# Patient Record
Sex: FEMALE | Race: WHITE | Employment: UNEMPLOYED | URBAN - METROPOLITAN AREA
[De-identification: names, ages, dates, MRNs, and addresses within clinical notes are randomized per-mention and may not be internally consistent; named-entity substitution may affect disease eponyms.]

---

## 2018-01-19 ENCOUNTER — HOSPITAL ENCOUNTER (OUTPATIENT)
Dept: PREADMISSION TESTING | Age: 33
Discharge: HOME | End: 2018-01-19

## 2018-01-19 VITALS
RESPIRATION RATE: 19 BRPM | WEIGHT: 138.67 LBS | OXYGEN SATURATION: 97 % | SYSTOLIC BLOOD PRESSURE: 104 MMHG | DIASTOLIC BLOOD PRESSURE: 59 MMHG | TEMPERATURE: 97.1 F | HEIGHT: 62 IN | BODY MASS INDEX: 25.52 KG/M2 | HEART RATE: 73 BPM

## 2018-01-19 NOTE — PERIOP NOTES
[facility]  PREOPERATIVE INSTRUCTIONS    Surgery Date: Friday, 1/26/18. Surgery arrival time given by surgeon: NO  (If [facility] staff will call you between 3pm - 7pm the day before surgery with your arrival time. If your surgery is on a Monday, we will call you the preceding Friday. Please call [phone] after 7pm if you did not receive your arrival time.)  Verification phone call on Thursday, 1/25/18. 1. Report  to the [address] [facility] on the day of your surgery. Bring your insurance card, photo identification, and any copayment (if applicable). 2. You must have a responsible adult to drive you home and stay with you the first 24 hours after surgery if you are going home the same day of your surgery. 3. Nothing to eat or drink after midnight the night before surgery. This means NO water, gum, mints, coffee, juice, etc.    4. MEDICATIONS TO TAKE THE MORNING OF SURGERY WITH A SIP OF WATER: None. 5. No alcoholic beverages 24 hours before and after your surgery. 6. If you are being admitted to the hospital,please leave personal belongings/luggage in your car until you have an assigned hospital room number. ( The hospital discharge time is 12 PM NOON. Your adult  should be at the hospital prior to the noon discharge time unless otherwise instructed.)   7. STOP Aspirin and/or any non-steroidal anti-inflammatory drugs (i.e. Ibuprofen, Naproxen, Advil, Aleve) as directed by your surgeon. You may take Tylenol. Stop herbal supplements 1 week prior to  surgery. 8. If you are currently taking Plavix, Coumadin,or any other blood-thinning/ anticoagulant medication contact your surgeon for instructions. 9. Wear comfortable clothes. Wear your glasses instead of contacts. Please leave all money, jewelry and valuables at home. No make up, particularly mascara, the day of surgery. 10.  REMOVE ALL body piercings, rings,and jewelry and leave at home.   Wear your hair loose or down, no pony-tails, buns, or any metal hair clips. 11. If you shower the morning of surgery, please do not apply any lotions, powders, or deodorants afterwards. Do not shave any body area within 24 hours of your surgery. 12. Please follow all instructions to avoid any potential surgical cancellation. 13. Should your physical condition change, (i.e. fever, cold, flu, etc.) please notify your surgeon as soon as possible. 14. It is important to be on time. If a situation occurs where you may be delayed, please call:  [phone] / [phone] on the day of surgery. 15. The [employer] staff can be reached at [phone]. 16. Special instructions: Free  parking. 17. The patient was contacted  in person. She  verbalize  understanding of all instructions does not  need reinforcement.

## 2018-01-25 ENCOUNTER — ANESTHESIA EVENT (OUTPATIENT)
Dept: SURGERY | Age: 33
End: 2018-01-25
Payer: SELF-PAY

## 2018-01-25 PROCEDURE — 76010010942 HC COSMETIC OR TIME CHARGE: Performed by: ANESTHESIOLOGY

## 2018-01-25 PROCEDURE — 76060000108 HC COSMETIC ANESTHESIA TIME CHARGE: Performed by: ANESTHESIOLOGY

## 2018-01-26 ENCOUNTER — HOSPITAL ENCOUNTER (OUTPATIENT)
Age: 33
Setting detail: OBSERVATION
Discharge: HOME | End: 2018-01-27
Attending: PLASTIC SURGERY | Admitting: PLASTIC SURGERY
Payer: SELF-PAY

## 2018-01-26 ENCOUNTER — ANESTHESIA (OUTPATIENT)
Dept: SURGERY | Age: 33
End: 2018-01-26
Payer: SELF-PAY

## 2018-01-26 DIAGNOSIS — K43.9 VENTRAL HERNIA WITHOUT OBSTRUCTION OR GANGRENE: Primary | ICD-10-CM

## 2018-01-26 PROCEDURE — 77030013358: Performed by: PLASTIC SURGERY

## 2018-01-26 PROCEDURE — 77030011266 HC ELECTRD BLD INSL COVD -A: Performed by: PLASTIC SURGERY

## 2018-01-26 PROCEDURE — 77030029372 HC ADH SKN CLSR PRINEO J&J -C

## 2018-01-26 PROCEDURE — 76060000033 HC ANESTHESIA 1 TO 1.5 HR: Performed by: PLASTIC SURGERY

## 2018-01-26 PROCEDURE — 77030020126 HC NDL ELECTRD MICROFN MEGA -B: Performed by: PLASTIC SURGERY

## 2018-01-26 PROCEDURE — 77030034850: Performed by: PLASTIC SURGERY

## 2018-01-26 PROCEDURE — 77030018719 HC DRSG PTCH ANTIMIC J&J -A: Performed by: PLASTIC SURGERY

## 2018-01-26 PROCEDURE — 77030002916 HC SUT ETHLN J&J -A: Performed by: PLASTIC SURGERY

## 2018-01-26 PROCEDURE — 74011250636 HC RX REV CODE- 250/636: Performed by: PLASTIC SURGERY

## 2018-01-26 PROCEDURE — 77030013567 HC DRN WND RESERV BARD -A: Performed by: PLASTIC SURGERY

## 2018-01-26 PROCEDURE — 76210000006 HC OR PH I REC 0.5 TO 1 HR: Performed by: PLASTIC SURGERY

## 2018-01-26 PROCEDURE — 77030033138 HC SUT PGA STRATFX J&J -B: Performed by: PLASTIC SURGERY

## 2018-01-26 PROCEDURE — 77030011267 HC ELECTRD BLD COVD -A: Performed by: PLASTIC SURGERY

## 2018-01-26 PROCEDURE — 74011250636 HC RX REV CODE- 250/636: Performed by: ANESTHESIOLOGY

## 2018-01-26 PROCEDURE — 77030008463 HC STPLR SKN PROX J&J -B: Performed by: PLASTIC SURGERY

## 2018-01-26 PROCEDURE — 77030010507 HC ADH SKN DERMBND J&J -B

## 2018-01-26 PROCEDURE — 77030020782 HC GWN BAIR PAWS FLX 3M -B

## 2018-01-26 PROCEDURE — 77030013629 HC ELECTRD NDL STRY -B: Performed by: PLASTIC SURGERY

## 2018-01-26 PROCEDURE — 99218 HC RM OBSERVATION: CPT

## 2018-01-26 PROCEDURE — 77030018836 HC SOL IRR NACL ICUM -A: Performed by: PLASTIC SURGERY

## 2018-01-26 PROCEDURE — 77030008534 HC TBNG LIPOSUC BYRO -B: Performed by: PLASTIC SURGERY

## 2018-01-26 PROCEDURE — 77030035236 HC SUT PDS STRATFX BARB J&J -B: Performed by: PLASTIC SURGERY

## 2018-01-26 PROCEDURE — 77030010512 HC APPL CLP LIG J&J -C: Performed by: PLASTIC SURGERY

## 2018-01-26 PROCEDURE — 77030036554: Performed by: PLASTIC SURGERY

## 2018-01-26 PROCEDURE — 76010000149 HC OR TIME 1 TO 1.5 HR: Performed by: PLASTIC SURGERY

## 2018-01-26 PROCEDURE — 77030011244 HC DRN WND HUBLS J&J -B: Performed by: PLASTIC SURGERY

## 2018-01-26 PROCEDURE — 76060000053 HC ANESTHESIA 11 TO 11.5 HR: Performed by: PLASTIC SURGERY

## 2018-01-26 PROCEDURE — 77030013079 HC BLNKT BAIR HGGR 3M -A: Performed by: ANESTHESIOLOGY

## 2018-01-26 PROCEDURE — 77030003666 HC NDL SPINAL BD -A: Performed by: PLASTIC SURGERY

## 2018-01-26 PROCEDURE — 77030020061 HC IV BLD WRMR ADMIN SET 3M -B: Performed by: ANESTHESIOLOGY

## 2018-01-26 PROCEDURE — 77030022517 HC TRNSFR ST F/ IMPL MENT -B: Performed by: PLASTIC SURGERY

## 2018-01-26 PROCEDURE — 77030002933 HC SUT MCRYL J&J -A: Performed by: PLASTIC SURGERY

## 2018-01-26 PROCEDURE — 77030010507 HC ADH SKN DERMBND J&J -B: Performed by: PLASTIC SURGERY

## 2018-01-26 PROCEDURE — 74011250636 HC RX REV CODE- 250/636

## 2018-01-26 PROCEDURE — 77030012412 HC DRN WND CARD -B: Performed by: PLASTIC SURGERY

## 2018-01-26 PROCEDURE — 77030002966 HC SUT PDS J&J -A: Performed by: PLASTIC SURGERY

## 2018-01-26 PROCEDURE — 77030026438 HC STYL ET INTUB CARD -A: Performed by: ANESTHESIOLOGY

## 2018-01-26 PROCEDURE — 77010033678 HC OXYGEN DAILY

## 2018-01-26 PROCEDURE — 77030008459 HC STPLR SKN COOP -B: Performed by: PLASTIC SURGERY

## 2018-01-26 PROCEDURE — 74011250637 HC RX REV CODE- 250/637: Performed by: PLASTIC SURGERY

## 2018-01-26 PROCEDURE — 77030019908 HC STETH ESOPH SIMS -A: Performed by: ANESTHESIOLOGY

## 2018-01-26 PROCEDURE — 74011000272 HC RX REV CODE- 272: Performed by: PLASTIC SURGERY

## 2018-01-26 PROCEDURE — 77030008684 HC TU ET CUF COVD -B: Performed by: ANESTHESIOLOGY

## 2018-01-26 PROCEDURE — 77030016570 HC BLNKT BAIR HGGR 3M -B: Performed by: PLASTIC SURGERY

## 2018-01-26 PROCEDURE — 77030029372 HC ADH SKN CLSR PRINEO J&J -C: Performed by: PLASTIC SURGERY

## 2018-01-26 PROCEDURE — 74011000250 HC RX REV CODE- 250

## 2018-01-26 PROCEDURE — 77030002986 HC SUT PROL J&J -A: Performed by: PLASTIC SURGERY

## 2018-01-26 PROCEDURE — 77030012406 HC DRN WND PENRS BARD -A: Performed by: PLASTIC SURGERY

## 2018-01-26 PROCEDURE — 77030002925 HC SUT GORTX WLGO -C: Performed by: PLASTIC SURGERY

## 2018-01-26 PROCEDURE — 76010000151: Performed by: PLASTIC SURGERY

## 2018-01-26 PROCEDURE — 77030008467 HC STPLR SKN COVD -B: Performed by: PLASTIC SURGERY

## 2018-01-26 PROCEDURE — 77030008526 HC TBNG ASPIR DISP MCRA -B: Performed by: PLASTIC SURGERY

## 2018-01-26 PROCEDURE — 77030010514 HC APPL CLP LIG COVD -B: Performed by: PLASTIC SURGERY

## 2018-01-26 PROCEDURE — 74011000250 HC RX REV CODE- 250: Performed by: PLASTIC SURGERY

## 2018-01-26 PROCEDURE — 77030032490 HC SLV COMPR SCD KNE COVD -B: Performed by: PLASTIC SURGERY

## 2018-01-26 PROCEDURE — 77030002986 HC SUT PROL J&J -A

## 2018-01-26 PROCEDURE — 77030031139 HC SUT VCRL2 J&J -A: Performed by: PLASTIC SURGERY

## 2018-01-26 PROCEDURE — 77030011640 HC PAD GRND REM COVD -A: Performed by: PLASTIC SURGERY

## 2018-01-26 PROCEDURE — 77030002996 HC SUT SLK J&J -A: Performed by: PLASTIC SURGERY

## 2018-01-26 PROCEDURE — 77030002991 HC SUT QUILL SSPC -B: Performed by: PLASTIC SURGERY

## 2018-01-26 PROCEDURE — 77030026227 HC FUNL KELLR 2 PCH ALGN -C: Performed by: PLASTIC SURGERY

## 2018-01-26 DEVICE — SMOOTH ROUND MODERATE PLUS PROFILE GEL-FILLED BREAST IMPLANT, 250CC  SMOOTH ROUND SILICONE
Type: IMPLANTABLE DEVICE | Site: BREAST | Status: FUNCTIONAL
Brand: MENTOR MEMORYGEL BREAST IMPLANT

## 2018-01-26 RX ORDER — DEXTROSE, SODIUM CHLORIDE, AND POTASSIUM CHLORIDE 5; .45; .15 G/100ML; G/100ML; G/100ML
125 INJECTION INTRAVENOUS CONTINUOUS
Status: DISCONTINUED | OUTPATIENT
Start: 2018-01-26 | End: 2018-01-27 | Stop reason: HOSPADM

## 2018-01-26 RX ORDER — EPHEDRINE SULFATE 50 MG/ML
INJECTION, SOLUTION INTRAVENOUS AS NEEDED
Status: DISCONTINUED | OUTPATIENT
Start: 2018-01-26 | End: 2018-01-26 | Stop reason: HOSPADM

## 2018-01-26 RX ORDER — MIDAZOLAM HYDROCHLORIDE 1 MG/ML
INJECTION, SOLUTION INTRAMUSCULAR; INTRAVENOUS AS NEEDED
Status: DISCONTINUED | OUTPATIENT
Start: 2018-01-26 | End: 2018-01-26 | Stop reason: HOSPADM

## 2018-01-26 RX ORDER — NALOXONE HYDROCHLORIDE 0.4 MG/ML
0.2 INJECTION, SOLUTION INTRAMUSCULAR; INTRAVENOUS; SUBCUTANEOUS
Status: DISCONTINUED | OUTPATIENT
Start: 2018-01-26 | End: 2018-01-26 | Stop reason: HOSPADM

## 2018-01-26 RX ORDER — MIDAZOLAM HYDROCHLORIDE 1 MG/ML
2 INJECTION, SOLUTION INTRAMUSCULAR; INTRAVENOUS
Status: DISCONTINUED | OUTPATIENT
Start: 2018-01-26 | End: 2018-01-26 | Stop reason: HOSPADM

## 2018-01-26 RX ORDER — SODIUM CHLORIDE 0.9 % (FLUSH) 0.9 %
5-10 SYRINGE (ML) INJECTION AS NEEDED
Status: DISCONTINUED | OUTPATIENT
Start: 2018-01-26 | End: 2018-01-27 | Stop reason: HOSPADM

## 2018-01-26 RX ORDER — KETAMINE HYDROCHLORIDE 10 MG/ML
INJECTION, SOLUTION INTRAMUSCULAR; INTRAVENOUS AS NEEDED
Status: DISCONTINUED | OUTPATIENT
Start: 2018-01-26 | End: 2018-01-26 | Stop reason: HOSPADM

## 2018-01-26 RX ORDER — PROPOFOL 10 MG/ML
INJECTION, EMULSION INTRAVENOUS AS NEEDED
Status: DISCONTINUED | OUTPATIENT
Start: 2018-01-26 | End: 2018-01-26 | Stop reason: HOSPADM

## 2018-01-26 RX ORDER — ONDANSETRON 2 MG/ML
4 INJECTION INTRAMUSCULAR; INTRAVENOUS
Status: DISCONTINUED | OUTPATIENT
Start: 2018-01-26 | End: 2018-01-27 | Stop reason: HOSPADM

## 2018-01-26 RX ORDER — OXYCODONE HYDROCHLORIDE 5 MG/1
10 TABLET ORAL
Status: DISCONTINUED | OUTPATIENT
Start: 2018-01-26 | End: 2018-01-27

## 2018-01-26 RX ORDER — LIDOCAINE HYDROCHLORIDE 20 MG/ML
INJECTION, SOLUTION EPIDURAL; INFILTRATION; INTRACAUDAL; PERINEURAL AS NEEDED
Status: DISCONTINUED | OUTPATIENT
Start: 2018-01-26 | End: 2018-01-26 | Stop reason: HOSPADM

## 2018-01-26 RX ORDER — SODIUM CHLORIDE, SODIUM LACTATE, POTASSIUM CHLORIDE, CALCIUM CHLORIDE 600; 310; 30; 20 MG/100ML; MG/100ML; MG/100ML; MG/100ML
125 INJECTION, SOLUTION INTRAVENOUS CONTINUOUS
Status: DISCONTINUED | OUTPATIENT
Start: 2018-01-26 | End: 2018-01-26 | Stop reason: HOSPADM

## 2018-01-26 RX ORDER — HYDROMORPHONE HYDROCHLORIDE 2 MG/ML
.25-1 INJECTION, SOLUTION INTRAMUSCULAR; INTRAVENOUS; SUBCUTANEOUS
Status: DISCONTINUED | OUTPATIENT
Start: 2018-01-26 | End: 2018-01-26 | Stop reason: HOSPADM

## 2018-01-26 RX ORDER — FENTANYL CITRATE 50 UG/ML
INJECTION, SOLUTION INTRAMUSCULAR; INTRAVENOUS AS NEEDED
Status: DISCONTINUED | OUTPATIENT
Start: 2018-01-26 | End: 2018-01-26 | Stop reason: HOSPADM

## 2018-01-26 RX ORDER — SODIUM CHLORIDE 0.9 % (FLUSH) 0.9 %
5-10 SYRINGE (ML) INJECTION EVERY 8 HOURS
Status: DISCONTINUED | OUTPATIENT
Start: 2018-01-26 | End: 2018-01-27 | Stop reason: HOSPADM

## 2018-01-26 RX ORDER — ROCURONIUM BROMIDE 10 MG/ML
INJECTION, SOLUTION INTRAVENOUS AS NEEDED
Status: DISCONTINUED | OUTPATIENT
Start: 2018-01-26 | End: 2018-01-26 | Stop reason: HOSPADM

## 2018-01-26 RX ORDER — LEVOFLOXACIN 5 MG/ML
500 INJECTION, SOLUTION INTRAVENOUS EVERY 24 HOURS
Status: COMPLETED | OUTPATIENT
Start: 2018-01-27 | End: 2018-01-27

## 2018-01-26 RX ORDER — ONDANSETRON 2 MG/ML
INJECTION INTRAMUSCULAR; INTRAVENOUS AS NEEDED
Status: DISCONTINUED | OUTPATIENT
Start: 2018-01-26 | End: 2018-01-26 | Stop reason: HOSPADM

## 2018-01-26 RX ORDER — LEVOFLOXACIN 5 MG/ML
500 INJECTION, SOLUTION INTRAVENOUS ONCE
Status: COMPLETED | OUTPATIENT
Start: 2018-01-26 | End: 2018-01-26

## 2018-01-26 RX ORDER — LIDOCAINE HYDROCHLORIDE 10 MG/ML
0.1 INJECTION, SOLUTION EPIDURAL; INFILTRATION; INTRACAUDAL; PERINEURAL AS NEEDED
Status: DISCONTINUED | OUTPATIENT
Start: 2018-01-26 | End: 2018-01-26 | Stop reason: HOSPADM

## 2018-01-26 RX ORDER — OXYCODONE HYDROCHLORIDE 5 MG/1
5 TABLET ORAL
Status: DISCONTINUED | OUTPATIENT
Start: 2018-01-26 | End: 2018-01-27 | Stop reason: HOSPADM

## 2018-01-26 RX ORDER — DIPHENHYDRAMINE HYDROCHLORIDE 50 MG/ML
12.5 INJECTION, SOLUTION INTRAMUSCULAR; INTRAVENOUS AS NEEDED
Status: DISCONTINUED | OUTPATIENT
Start: 2018-01-26 | End: 2018-01-26 | Stop reason: HOSPADM

## 2018-01-26 RX ORDER — HYDROMORPHONE HYDROCHLORIDE 2 MG/ML
INJECTION, SOLUTION INTRAMUSCULAR; INTRAVENOUS; SUBCUTANEOUS AS NEEDED
Status: DISCONTINUED | OUTPATIENT
Start: 2018-01-26 | End: 2018-01-26 | Stop reason: HOSPADM

## 2018-01-26 RX ORDER — FLUMAZENIL 0.1 MG/ML
0.2 INJECTION INTRAVENOUS
Status: DISCONTINUED | OUTPATIENT
Start: 2018-01-26 | End: 2018-01-26 | Stop reason: HOSPADM

## 2018-01-26 RX ORDER — DIPHENHYDRAMINE HCL 25 MG
25 CAPSULE ORAL
Status: DISPENSED | OUTPATIENT
Start: 2018-01-26 | End: 2018-01-27

## 2018-01-26 RX ORDER — DEXAMETHASONE SODIUM PHOSPHATE 4 MG/ML
INJECTION, SOLUTION INTRA-ARTICULAR; INTRALESIONAL; INTRAMUSCULAR; INTRAVENOUS; SOFT TISSUE AS NEEDED
Status: DISCONTINUED | OUTPATIENT
Start: 2018-01-26 | End: 2018-01-26 | Stop reason: HOSPADM

## 2018-01-26 RX ORDER — ENOXAPARIN SODIUM 100 MG/ML
40 INJECTION SUBCUTANEOUS EVERY 24 HOURS
Status: DISCONTINUED | OUTPATIENT
Start: 2018-01-27 | End: 2018-01-27 | Stop reason: HOSPADM

## 2018-01-26 RX ORDER — HYDROMORPHONE HYDROCHLORIDE 2 MG/ML
2 INJECTION, SOLUTION INTRAMUSCULAR; INTRAVENOUS; SUBCUTANEOUS
Status: DISCONTINUED | OUTPATIENT
Start: 2018-01-26 | End: 2018-01-27 | Stop reason: HOSPADM

## 2018-01-26 RX ADMIN — ROCURONIUM BROMIDE 20 MG: 10 INJECTION, SOLUTION INTRAVENOUS at 15:03

## 2018-01-26 RX ADMIN — FENTANYL CITRATE 100 MCG: 50 INJECTION, SOLUTION INTRAMUSCULAR; INTRAVENOUS at 12:09

## 2018-01-26 RX ADMIN — FENTANYL CITRATE 100 MCG: 50 INJECTION, SOLUTION INTRAMUSCULAR; INTRAVENOUS at 15:06

## 2018-01-26 RX ADMIN — FENTANYL CITRATE 50 MCG: 50 INJECTION, SOLUTION INTRAMUSCULAR; INTRAVENOUS at 11:30

## 2018-01-26 RX ADMIN — ROCURONIUM BROMIDE 20 MG: 10 INJECTION, SOLUTION INTRAVENOUS at 14:05

## 2018-01-26 RX ADMIN — ROCURONIUM BROMIDE 20 MG: 10 INJECTION, SOLUTION INTRAVENOUS at 16:41

## 2018-01-26 RX ADMIN — EPHEDRINE SULFATE 10 MG: 50 INJECTION, SOLUTION INTRAVENOUS at 11:07

## 2018-01-26 RX ADMIN — KETAMINE HYDROCHLORIDE 50 MG: 10 INJECTION, SOLUTION INTRAMUSCULAR; INTRAVENOUS at 10:00

## 2018-01-26 RX ADMIN — EPHEDRINE SULFATE 10 MG: 50 INJECTION, SOLUTION INTRAVENOUS at 10:53

## 2018-01-26 RX ADMIN — FENTANYL CITRATE 100 MCG: 50 INJECTION, SOLUTION INTRAMUSCULAR; INTRAVENOUS at 18:29

## 2018-01-26 RX ADMIN — ROCURONIUM BROMIDE 10 MG: 10 INJECTION, SOLUTION INTRAVENOUS at 11:09

## 2018-01-26 RX ADMIN — ROCURONIUM BROMIDE 20 MG: 10 INJECTION, SOLUTION INTRAVENOUS at 18:26

## 2018-01-26 RX ADMIN — ONDANSETRON 4 MG: 2 INJECTION INTRAMUSCULAR; INTRAVENOUS at 23:04

## 2018-01-26 RX ADMIN — FENTANYL CITRATE 100 MCG: 50 INJECTION, SOLUTION INTRAMUSCULAR; INTRAVENOUS at 13:13

## 2018-01-26 RX ADMIN — ROCURONIUM BROMIDE 10 MG: 10 INJECTION, SOLUTION INTRAVENOUS at 11:31

## 2018-01-26 RX ADMIN — HYDROMORPHONE HYDROCHLORIDE 0.5 MG: 2 INJECTION, SOLUTION INTRAMUSCULAR; INTRAVENOUS; SUBCUTANEOUS at 20:25

## 2018-01-26 RX ADMIN — FENTANYL CITRATE 50 MCG: 50 INJECTION, SOLUTION INTRAMUSCULAR; INTRAVENOUS at 21:02

## 2018-01-26 RX ADMIN — ROCURONIUM BROMIDE 40 MG: 10 INJECTION, SOLUTION INTRAVENOUS at 10:00

## 2018-01-26 RX ADMIN — PROPOFOL 160 MG: 10 INJECTION, EMULSION INTRAVENOUS at 10:00

## 2018-01-26 RX ADMIN — LIDOCAINE HYDROCHLORIDE 50 MG: 20 INJECTION, SOLUTION EPIDURAL; INFILTRATION; INTRACAUDAL; PERINEURAL at 10:00

## 2018-01-26 RX ADMIN — SODIUM CHLORIDE, SODIUM LACTATE, POTASSIUM CHLORIDE, AND CALCIUM CHLORIDE: 600; 310; 30; 20 INJECTION, SOLUTION INTRAVENOUS at 15:04

## 2018-01-26 RX ADMIN — FENTANYL CITRATE 100 MCG: 50 INJECTION, SOLUTION INTRAMUSCULAR; INTRAVENOUS at 18:02

## 2018-01-26 RX ADMIN — SODIUM CHLORIDE, SODIUM LACTATE, POTASSIUM CHLORIDE, AND CALCIUM CHLORIDE: 600; 310; 30; 20 INJECTION, SOLUTION INTRAVENOUS at 18:04

## 2018-01-26 RX ADMIN — FENTANYL CITRATE 200 MCG: 50 INJECTION, SOLUTION INTRAMUSCULAR; INTRAVENOUS at 10:00

## 2018-01-26 RX ADMIN — HYDROMORPHONE HYDROCHLORIDE 1 MG: 2 INJECTION, SOLUTION INTRAMUSCULAR; INTRAVENOUS; SUBCUTANEOUS at 19:07

## 2018-01-26 RX ADMIN — DEXAMETHASONE SODIUM PHOSPHATE 8 MG: 4 INJECTION, SOLUTION INTRA-ARTICULAR; INTRALESIONAL; INTRAMUSCULAR; INTRAVENOUS; SOFT TISSUE at 10:05

## 2018-01-26 RX ADMIN — ONDANSETRON 4 MG: 2 INJECTION INTRAMUSCULAR; INTRAVENOUS at 19:57

## 2018-01-26 RX ADMIN — EPHEDRINE SULFATE 10 MG: 50 INJECTION, SOLUTION INTRAVENOUS at 12:42

## 2018-01-26 RX ADMIN — MIDAZOLAM HYDROCHLORIDE 3 MG: 1 INJECTION, SOLUTION INTRAMUSCULAR; INTRAVENOUS at 09:53

## 2018-01-26 RX ADMIN — SODIUM CHLORIDE, SODIUM LACTATE, POTASSIUM CHLORIDE, AND CALCIUM CHLORIDE 125 ML/HR: 600; 310; 30; 20 INJECTION, SOLUTION INTRAVENOUS at 08:41

## 2018-01-26 RX ADMIN — ROCURONIUM BROMIDE 20 MG: 10 INJECTION, SOLUTION INTRAVENOUS at 12:09

## 2018-01-26 RX ADMIN — FENTANYL CITRATE 50 MCG: 50 INJECTION, SOLUTION INTRAMUSCULAR; INTRAVENOUS at 21:01

## 2018-01-26 RX ADMIN — HYDROMORPHONE HYDROCHLORIDE 0.5 MG: 2 INJECTION INTRAMUSCULAR; INTRAVENOUS; SUBCUTANEOUS at 21:28

## 2018-01-26 RX ADMIN — LEVOFLOXACIN 500 MG: 5 INJECTION, SOLUTION INTRAVENOUS at 09:53

## 2018-01-26 RX ADMIN — EPHEDRINE SULFATE 10 MG: 50 INJECTION, SOLUTION INTRAVENOUS at 10:48

## 2018-01-26 RX ADMIN — FENTANYL CITRATE 100 MCG: 50 INJECTION, SOLUTION INTRAMUSCULAR; INTRAVENOUS at 16:16

## 2018-01-26 RX ADMIN — FENTANYL CITRATE 100 MCG: 50 INJECTION, SOLUTION INTRAMUSCULAR; INTRAVENOUS at 10:38

## 2018-01-26 RX ADMIN — HYDROMORPHONE HYDROCHLORIDE 0.5 MG: 2 INJECTION, SOLUTION INTRAMUSCULAR; INTRAVENOUS; SUBCUTANEOUS at 20:03

## 2018-01-26 RX ADMIN — SODIUM CHLORIDE, SODIUM LACTATE, POTASSIUM CHLORIDE, AND CALCIUM CHLORIDE: 600; 310; 30; 20 INJECTION, SOLUTION INTRAVENOUS at 12:11

## 2018-01-26 RX ADMIN — ROCURONIUM BROMIDE 20 MG: 10 INJECTION, SOLUTION INTRAVENOUS at 17:18

## 2018-01-26 RX ADMIN — SODIUM CHLORIDE, SODIUM LACTATE, POTASSIUM CHLORIDE, AND CALCIUM CHLORIDE: 600; 310; 30; 20 INJECTION, SOLUTION INTRAVENOUS at 20:33

## 2018-01-26 RX ADMIN — OXYCODONE HYDROCHLORIDE 10 MG: 5 TABLET ORAL at 23:02

## 2018-01-26 RX ADMIN — SODIUM CHLORIDE, SODIUM LACTATE, POTASSIUM CHLORIDE, AND CALCIUM CHLORIDE: 600; 310; 30; 20 INJECTION, SOLUTION INTRAVENOUS at 10:45

## 2018-01-26 RX ADMIN — FENTANYL CITRATE 100 MCG: 50 INJECTION, SOLUTION INTRAMUSCULAR; INTRAVENOUS at 14:05

## 2018-01-26 RX ADMIN — ROCURONIUM BROMIDE 20 MG: 10 INJECTION, SOLUTION INTRAVENOUS at 13:13

## 2018-01-26 RX ADMIN — FENTANYL CITRATE 50 MCG: 50 INJECTION, SOLUTION INTRAMUSCULAR; INTRAVENOUS at 17:17

## 2018-01-26 RX ADMIN — MIDAZOLAM HYDROCHLORIDE 2 MG: 1 INJECTION, SOLUTION INTRAMUSCULAR; INTRAVENOUS at 09:55

## 2018-01-26 RX ADMIN — POTASSIUM CHLORIDE, DEXTROSE MONOHYDRATE AND SODIUM CHLORIDE 125 ML/HR: 150; 5; 450 INJECTION, SOLUTION INTRAVENOUS at 21:23

## 2018-01-26 RX ADMIN — ROCURONIUM BROMIDE 20 MG: 10 INJECTION, SOLUTION INTRAVENOUS at 15:51

## 2018-01-26 NOTE — PERIOP NOTES
10:50 AM  Family called and informed of patient's progress. 12:22 PM  Family called and informed of patient's progress. 1:51 PM  Family called and informed of patient's progress. 3:27 PM  Family called and informed of patient's progress.

## 2018-01-26 NOTE — IP AVS SNAPSHOT
[address] [facility] [address] 
 
 
 [address] [facility] 
[phone] Patient: Celia Sarah MRN: [id number] :[birth year] About your hospitalization You were admitted on:  January 26, 2018 You last received care in the:  [facility] 4M POST SURG ORT 1 You were discharged on:  January 27, 2018 Why you were hospitalized Your primary diagnosis was:  Not on File Your diagnoses also included:  Ventral Hernia Follow-up Information Follow up With Details Comments Contact Info [facility] [address] 
[phone] Katheryn Gilman MD Schedule an appointment as soon as possible for a visit in 2 days  [address] [place] [address] 
[phone] Discharge Orders None A check reed indicates which time of day the medication should be taken. My Medications START taking these medications Instructions Each Dose to Equal  
 Morning Noon Evening Bedtime  
 diazePAM 5 mg tablet Commonly known as:  VALIUM Your last dose was:  Have not taken in the hospital 
  
Your next dose is:  Anytime as needed. Take 1 Tab by mouth every six (6) hours as needed for Anxiety. Max Daily Amount: 20 mg. Indications: Muscle Spasm 5 mg HYDROmorphone 2 mg tablet Commonly known as:  DILAUDID Your last dose was:  1/27/18 2:50 p.m. Your next dose is:  1/27/18 6:50 pm if needed Take 1 Tab by mouth every four (4) hours as needed for Pain (May take 2 tabs). Max Daily Amount: 12 mg.  
 2 mg  
    
   
   
   
  
 ondansetron 4 mg disintegrating tablet Commonly known as:  ZOFRAN ODT Your last dose was:  Have not taken in the hospital.   
Your next dose is:  Anytime as needed. Take 1 Tab by mouth every eight (8) hours as needed for Nausea. 4 mg Where to Get Your Medications Information on where to get these meds will be given to you by the nurse or doctor. ! Ask your nurse or doctor about these medications  
  diazePAM 5 mg tablet HYDROmorphone 2 mg tablet  
 ondansetron 4 mg disintegrating tablet Discharge Instructions Keep dressings dry and in place until follow up. Follow NINI drain care instructions and bring log to follow up appointment. Avoid strenuous activity, pushing, pulling, or heavy lifting. Sleep on back only with head elevated on 3-4 pillows. Introducing [facility] SERVICES! Sheron Sahni introduces [employer] patient portal. Now you can access parts of your medical record, email your doctor's office, and request medication refills online. 1. In your internet browser, go to [email or web address] 2. Click on the First Time User? Click Here link in the Sign In box. You will see the New Member Sign Up page. 3. Enter your [employer] Access Code exactly as it appears below. You will not need to use this code after youve completed the sign-up process. If you do not sign up before the expiration date, you must request a new code. · [employer] Access Code: [email or web address] Expires: 4/19/2018  1:58 PM 
 
4. Enter the last four digits of your Social Security Number (xxxx) and Date of Birth (mm/dd/yyyy) as indicated and click Submit. You will be taken to the next sign-up page. 5. Create a [employer] ID. This will be your [employer] login ID and cannot be changed, so think of one that is secure and easy to remember. 6. Create a [employer] password. You can change your password at any time. 7. Enter your Password Reset Question and Answer. This can be used at a later time if you forget your password. 8. Enter your e-mail address. You will receive e-mail notification when new information is available in [address] 9. Click Sign Up. You can now view and download portions of your medical record. 10. Click the Download Summary menu link to download a portable copy of your medical information. If you have questions, please visit the Frequently Asked Questions section of the [employer] website. Remember, [employer] is NOT to be used for urgent needs. For medical emergencies, dial 911. Now available from your iPhone and Android! Providers Seen During Your Hospitalization Provider Specialty Primary office phone Noelia Canavan, MD Plastic Surgery [phone] Your Primary Care Physician (PCP) Primary Care Physician Office Phone Office Fax Cheryle Valladares [phone] You are allergic to the following Allergen Reactions Pcn (Penicillins) Anaphylaxis Recent Documentation OB Status Smoking Status Hysterectomy Never Smoker Emergency Contacts Name Discharge Info Relation Home Work Mobile [facility] CAREGIVER [3] Spouse [3] [phone] Patient Belongings The following personal items are in your possession at time of discharge: 
  Dental Appliances: None  Visual Aid: None Discharge Instructions Attachments/References ABDOMINAL HERNIA REPAIR: POST-OP (ENGLISH) SURGICAL DRAIN CARE (ENGLISH) LIPOSUCTION: POST-OP (ENGLISH) Patient Handouts Abdominal Hernia Repair: What to Expect at Home Your Recovery After surgery to repair your hernia, you are likely to have pain for a few days. You may also feel like you have the flu, and you may have a low fever and feel tired and nauseated. This is common. You should feel better after a few days and will probably feel much better in 7 days. For several weeks you may feel twinges or pulling in the hernia repair when you move. You may have some bruising around the area of your hernia repair.  This is normal. 
This care sheet gives you a general idea about how long it will take for you to recover. But each person recovers at a different pace. Follow the steps below to get better as quickly as possible. How can you care for yourself at home? Activity ? · Rest when you feel tired. Getting enough sleep will help you recover. ? · Try to walk each day. Start by walking a little more than you did the day before. Bit by bit, increase the amount you walk. Walking boosts blood flow and helps prevent pneumonia and constipation. ? · If your doctor gives you an abdominal binder to wear, use it as directed. This is an elastic bandage that wraps around your belly and upper hips. It helps support your belly muscles after surgery. ? · Avoid strenuous activities, such as biking, jogging, weight lifting, or aerobic exercise, until your doctor says it is okay. ? · Avoid lifting anything that would make you strain. This may include heavy grocery bags and milk containers, a heavy briefcase or backpack, cat litter or dog food bags, a vacuum , or a child. ? · Ask your doctor when you can drive again. ? · Most people are able to return to work within 1 to 2 weeks after surgery. But if your job requires that you to do heavy lifting or strenuous activity, you may need to take 4 to 6 weeks off from work. ? · You may shower 24 to 48 hours after surgery, if your doctor okays it. Pat the cut (incision) dry. Do not take a bath for the first 2 weeks, or until your doctor tells you it is okay. ? · Ask your doctor when it is okay for you to have sex. Diet ? · You can eat your normal diet. If your stomach is upset, try bland, low-fat foods like plain rice, broiled chicken, toast, and yogurt. ? · Drink plenty of fluids (unless your doctor tells you not to). ? · You may notice that your bowel movements are not regular right after your surgery. This is common. Avoid constipation and straining with bowel movements. You may want to take a fiber supplement every day.  If you have not had a bowel movement after a couple of days, ask your doctor about taking a mild laxative. Medicines ? · Your doctor will tell you if and when you can restart your medicines. He or she will also give you instructions about taking any new medicines. ? · If you take blood thinners, such as warfarin (Coumadin), clopidogrel (Plavix), or aspirin, be sure to talk to your doctor. He or she will tell you if and when to start taking those medicines again. Make sure that you understand exactly what your doctor wants you to do. ? · Be safe with medicines. Take pain medicines exactly as directed. ¨ If the doctor gave you a prescription medicine for pain, take it as prescribed. ¨ If you are not taking a prescription pain medicine, ask your doctor if you can take an over-the-counter medicine. ? · If your doctor prescribed antibiotics, take them as directed. Do not stop taking them just because you feel better. You need to take the full course of antibiotics. ? · If you think your pain medicine is making you sick to your stomach: 
¨ Take your medicine after meals (unless your doctor has told you not to). ¨ Ask your doctor for a different pain medicine. Incision care ? · If you have strips of tape on the cut (incision) the doctor made, leave the tape on for a week or until it falls off. Or follow your doctor's instructions for removing the tape. ? · If you have staples closing the cut, you will need to visit your doctor in 1 to 2 weeks to have them removed. ? · Wash the area daily with warm, soapy water, and pat it dry. Don't use hydrogen peroxide or alcohol, which can slow healing. You may cover the area with a gauze bandage if it weeps or rubs against clothing. Change the bandage every day. Other instructions ? · Hold a pillow over your incision when you cough or take deep breaths. This will support your belly and decrease your pain. ? · Do breathing exercises at home as instructed by your doctor. This will help prevent pneumonia. ? · If you had laparoscopic surgery, you may also have pain in your left shoulder. The pain usually lasts about a day or two. Follow-up care is a key part of your treatment and safety. Be sure to make and go to all appointments, and call your doctor if you are having problems. It's also a good idea to know your test results and keep a list of the medicines you take. When should you call for help? Call 911 anytime you think you may need emergency care. For example, call if: 
? · You passed out (lost consciousness). ? · You are short of breath. ?Call your doctor now or seek immediate medical care if: 
? · You are sick to your stomach and cannot drink fluids. ? · You have signs of a blood clot in your leg (called a deep vein thrombosis), such as: 
¨ Pain in your calf, back of the knee, thigh, or groin. ¨ Redness and swelling in your leg or groin. ? · You have signs of infection, such as: 
¨ Increased pain, swelling, warmth, or redness. ¨ Red streaks leading from the incision. ¨ Pus draining from the incision. ¨ A fever. ? · You cannot pass stools or gas. ? · You have pain that does not get better after you take pain medicine. ? · You have loose stitches, or your incision comes open. ? · Bright red blood has soaked through the bandage over your incision. ? Watch closely for changes in your health, and be sure to contact your doctor if you have any problems. Where can you learn more? Go to http://hebert-diamond.info/. Enter B577 in the search box to learn more about \"Abdominal Hernia Repair: What to Expect at Home. \" Current as of: May 12, 2017 Content Version: 11.4 © 1697-6525 Healthwise, Incorporated.  Care instructions adapted under license by [employer] (which disclaims liability or warranty for this information). If you have questions about a medical condition or this instruction, always ask your healthcare professional. [address] any warranty or liability for your use of this information. Surgical Drain Care: Care Instructions What is a surgical drain? After a surgery, fluid may collect inside your body in the surgical area. This makes an infection or other problems more likely. A surgical drain allows the fluid to flow out. The doctor will put a thin rubber tube into the area of your body where the fluid is likely to collect. The rubber tube will carry the fluid outside your body. The most common type of surgical drain carries the fluid into a collection bulb that you empty. This is called a Vaibhav-Dumont drain. The drain uses suction created by the bulb to pull the fluid from your body into the bulb. The rubber tube will probably be held in place by one or two stitches in your skin. Most people attach the bulb with a safety pin to clothing or near the bandage so that it doesn't flip around or pull on the stitches. When you first get the drain, the fluid will be bloody. It will change color from red to pink to a light yellow or clear as the wound heals and the fluid starts to go away. Your doctor may give you specific information on when you no longer need the drain and when it will be removed. In general, you will need the drain until you are collecting less than about 2 tablespoons of fluid in 24 hours. Follow-up care is a key part of your treatment and safety. Be sure to make and go to all appointments, and call your doctor if you are having problems. It's also a good idea to know your test results and keep a list of the medicines you take. How can you care for yourself at home? Fluid collection Follow any instructions your doctor gives you. How often you empty the bulb depends on how much fluid is draining. Empty the bulb when it is half full. To empty the bulb: 
· Wash your hands with soap and water. · Take the plug out of the bulb. · Empty the bulb. If your doctor asks you to measure the fluid, empty the fluid into a measuring cup, and write down the color and how much you collected. Your doctor will want to know this information. How often you empty the bulb depends on how much fluid there is. Doctors often suggest emptying it when it's about half full. · Clean the plug with alcohol. · Squeeze the bulb until it is flat. This removes all the air from the bulb. You may need to put the bulb on a table or a counter to flatten it. · Keep the bulb flat and put the plug in. · The bulb should stay flat after you put the plug back in. This creates the suction that pulls the fluid into the bulb. · Empty the fluid into the toilet. · Wash your hands. Bandage care You may have a bandage. Your doctor will tell you how often to change it. · Wash your hands with soap and water. · Take off the bandage from around the drain. · Clean the drain site and the skin around it with soap and water. Use gauze or a cotton swab. · When the site is dry, put on a new bandage. Drain care Squeezing or \"milking\" the tube can help prevent clogs so that it drains correctly. Your doctor will tell you when you need to do this. In general, you do this when: 
· You see a clot in the tube that is preventing fluid from draining. The clot may look like a dark, stringy lining. · You see fluid leaking around the tube where it goes into the skin. · You think there is no suction in the drain. To milk the tube: · Use one hand to hold and pinch the tube where it leaves the skin. · With the other hand, pinch the tube with your thumb and first finger just below where you're holding it. · Slowly and firmly push your thumb and first finger down the tubing toward the bulb. · Do this as many times as you need to. The clot should move down the tube and into the bulb. When should you call for help? Call your doctor now or seek immediate medical care if: 
? · You have signs of infection, such as: 
¨ Increased pain, swelling, warmth, or redness around the area. ¨ Red streaks leading from the area. ¨ Pus draining from the area. ¨ A fever. ? · You see a sudden change in the color or smell of the drainage. ? · The tube is coming loose where it leaves your skin. ? Watch closely for changes in your health, and be sure to contact your doctor if: 
? · You see a lot of fluid around the drain. ? · You cannot remove a clot from the tube by milking the tube. Where can you learn more? Go to http://hebert-diamond.info/. Enter K117 in the search box to learn more about \"Surgical Drain Care: Care Instructions. \" Current as of: March 20, 2017 Content Version: 11.4 © 1490-8053 [employer]. Care instructions adapted under license by [employer] (which disclaims liability or warranty for this information). If you have questions about a medical condition or this instruction, always ask your healthcare professional. [place] [address] any warranty or liability for your use of this information. Liposuction: What to Expect at [facility] Your Recovery After liposuction, the area will be wrapped to help reduce swelling, bruising, and pain. The wrap helps make the area a smooth shape and prevent blood from filling the area where fat was removed. Elastic bandages and tape, support hose, a special girdle, or another type of firm-fitting clothing may be used. You may have to keep this wrap on for 3 to 4 weeks. If fat was removed from your calves or ankles, you may need to wear support hose for about 6 weeks. Fluid may drain from the cuts (incisions) for several days. The fluid will be bloody at first, but will turn clear in a few days. The area will probably be bruised and swollen for at least 10 to 14 days. You will be able to return to your normal activities as soon as you feel comfortable. This may take several days to a few weeks. Most people can return to light work within a few days. It may take longer to get back to normal if a lot of fat was removed. This care sheet gives you a general idea about how long it will take for you to recover. But each person recovers at a different pace. Follow the steps below to get better as quickly as possible. How can you care for yourself at home? Activity ? · Rest when you feel tired. Getting enough sleep will help you recover. ? · Try to walk each day. Start by walking a little more than you did the day before. Bit by bit, increase the amount you walk. Walking boosts blood flow and helps prevent pneumonia and constipation. ? · You will probably be able to return to work within a few days. ? · Avoid strenuous activities, such as bicycle riding, jogging, weight lifting, or aerobic exercise, until your doctor says it is okay. This may be in 2 to 3 weeks. Diet ? · You can eat your normal diet. If your stomach is upset, try bland, low-fat foods like plain rice, broiled chicken, toast, and yogurt. Medicines ? · Your doctor will tell you if and when you can restart your medicines. He or she will also give you instructions about taking any new medicines. ? · If you take blood thinners, such as warfarin (Coumadin), clopidogrel (Plavix), or aspirin, be sure to talk to your doctor. He or she will tell you if and when to start taking those medicines again. Make sure that you understand exactly what your doctor wants you to do. ? · If your doctor prescribed antibiotics, take them as directed. Do not stop taking them just because you feel better. You need to take the full course of antibiotics. ? · Take pain medicines exactly as directed. ¨ If the doctor gave you a prescription medicine for pain, take it as prescribed. ¨ If you are not taking a prescription pain medicine, ask your doctor if you can take an over-the-counter medicine. Incision care ? · If you have strips of tape on the cuts (incisions) the doctor made, leave the tape on for a week or until it falls off. Or follow your doctor's instructions for removing the tape. ? · If you have stitches or staples, your doctor will tell you when to come in to have them removed. Hygiene issues ? · You may shower 24 to 48 hours after surgery, if your doctor okays it. You may remove the compression wraps when you shower. Pat the cuts (incisions) dry. ? · Do not take a bath for the first 2 weeks, or until your doctor tells you it is okay. Follow-up care is a key part of your treatment and safety. Be sure to make and go to all appointments, and call your doctor if you are having problems. It's also a good idea to know your test results and keep a list of the medicines you take. When should you call for help? Call 911 anytime you think you may need emergency care. For example, call if: 
· You passed out (lost consciousness). · You have severe trouble breathing. · You have symptoms of a blood clot in your lung (called a pulmonary embolism). These may include: 
¨ Sudden chest pain. ¨ Trouble breathing. ¨ Coughing up blood. Call your doctor now or seek immediate medical care if: 
· You have shortness of breath. · You have new belly pain. · You have signs of infection, such as: 
¨ Increased pain, swelling, warmth, or redness. ¨ Red streaks leading from the incision. ¨ Pus draining from the incision. ¨ A fever. · You have signs of a blood clot, such as: 
¨ Pain in your calf, back of the knee, thigh, or groin. ¨ Redness and swelling in your leg or groin. · You have loose stitches, or your incision comes open. · You are bleeding from the incision. Watch closely for changes in your health, and be sure to contact your doctor if you have any problems. Where can you learn more? Go to http://hebert-diamond.info/. Enter M855 in the search box to learn more about \"Liposuction: What to Expect at Home. \" Current as of: October 13, 2016 Content Version: 11.4 © 3130-4663 Healthwise, Incorporated. Care instructions adapted under license by [employer] (which disclaims liability or warranty for this information). If you have questions about a medical condition or this instruction, always ask your healthcare professional. [address] any warranty or liability for your use of this information. Please provide this summary of care documentation to your next provider. Signatures-by signing, you are acknowledging that this After Visit Summary has been reviewed with you and you have received a copy. Patient Signature:  ____________________________________________________________ Date:  ____________________________________________________________  
  
Sara Gutierrez Provider Signature:  ____________________________________________________________ Date:  ____________________________________________________________

## 2018-01-26 NOTE — H&P
History and Physical    Patient is a 34 y.o. well-developed, well nourished female who presents for excision of abdominal skin, ventral hernia repair, mastopexy with breast augmentation, liposuction of the thighs. History reviewed. No pertinent past medical history. Past Surgical History:   Procedure Laterality Date    BREAST SURGERY PROCEDURE UNLISTED Left 2000    left breast lymph node removal    HX CHOLECYSTECTOMY  09/2017    HX GI      HX HEENT  2003    Lymph node removed from back of neck    HX HEENT      T & A as a child    HX HYSTERECTOMY  2016    HX OTHER SURGICAL  02/2016    rectocele repaired    HX UROLOGICAL  02/2016    Bladder Sling     Prior to Admission medications    Not on File     Allergies   Allergen Reactions    Pcn [Penicillins] Anaphylaxis       General:   No apparent distress. Heart:  Regular rate and rhythm without murmurs or rubs. Lungs:  Clear to ausculation bilaterally; no wheezes, rales or rhonchi. Patient is ready to go to surgery.     Richard Castillo MD  1/26/2018

## 2018-01-26 NOTE — ANESTHESIA PREPROCEDURE EVALUATION
Anesthetic History   No history of anesthetic complications            Review of Systems / Medical History  Patient summary reviewed, nursing notes reviewed and pertinent labs reviewed    Pulmonary  Within defined limits                 Neuro/Psych   Within defined limits           Cardiovascular  Within defined limits                Exercise tolerance: >4 METS     GI/Hepatic/Renal  Within defined limits              Endo/Other  Within defined limits           Other Findings              Physical Exam    Airway  Mallampati: II    Neck ROM: normal range of motion   Mouth opening: Normal     Cardiovascular  Regular rate and rhythm,  S1 and S2 normal,  no murmur, click, rub, or gallop  Rhythm: regular  Rate: normal         Dental  No notable dental hx       Pulmonary  Breath sounds clear to auscultation               Abdominal  GI exam deferred       Other Findings            Anesthetic Plan    ASA: 1  Anesthesia type: general          Induction: Intravenous  Anesthetic plan and risks discussed with: Patient

## 2018-01-26 NOTE — PERIOP NOTES
Tumescents in  Right outer thigh: 900 ml  Right inner thigh: 475  Left outer thigh: 900 ml  Left inner thigh: 475 ml    Liposuction:  Right outer thigh: 750 ml  Right inner thigh: 500 ml  Left outer thigh: 800 ml  Left inner thigh: 500 ml

## 2018-01-27 VITALS
DIASTOLIC BLOOD PRESSURE: 51 MMHG | SYSTOLIC BLOOD PRESSURE: 95 MMHG | TEMPERATURE: 98 F | OXYGEN SATURATION: 93 % | HEART RATE: 84 BPM | RESPIRATION RATE: 16 BRPM

## 2018-01-27 PROCEDURE — 74011250637 HC RX REV CODE- 250/637: Performed by: PLASTIC SURGERY

## 2018-01-27 PROCEDURE — 74011250636 HC RX REV CODE- 250/636: Performed by: PLASTIC SURGERY

## 2018-01-27 PROCEDURE — 77010033678 HC OXYGEN DAILY

## 2018-01-27 PROCEDURE — 99218 HC RM OBSERVATION: CPT

## 2018-01-27 RX ORDER — HYDROMORPHONE HYDROCHLORIDE 2 MG/1
2 TABLET ORAL
Qty: 40 TAB | Refills: 0 | Status: SHIPPED | OUTPATIENT
Start: 2018-01-27 | End: 2018-10-04 | Stop reason: ALTCHOICE

## 2018-01-27 RX ORDER — HYDROMORPHONE HYDROCHLORIDE 2 MG/1
2 TABLET ORAL
Status: DISCONTINUED | OUTPATIENT
Start: 2018-01-27 | End: 2018-01-27 | Stop reason: HOSPADM

## 2018-01-27 RX ORDER — KETOROLAC TROMETHAMINE 30 MG/ML
30 INJECTION, SOLUTION INTRAMUSCULAR; INTRAVENOUS ONCE
Status: COMPLETED | OUTPATIENT
Start: 2018-01-27 | End: 2018-01-27

## 2018-01-27 RX ORDER — ONDANSETRON 4 MG/1
4 TABLET, ORALLY DISINTEGRATING ORAL
Qty: 12 TAB | Refills: 0 | Status: SHIPPED | OUTPATIENT
Start: 2018-01-27 | End: 2018-10-04 | Stop reason: ALTCHOICE

## 2018-01-27 RX ORDER — DIPHENHYDRAMINE HCL 25 MG
25 CAPSULE ORAL
Status: DISCONTINUED | OUTPATIENT
Start: 2018-01-27 | End: 2018-01-27 | Stop reason: HOSPADM

## 2018-01-27 RX ORDER — DIAZEPAM 5 MG/1
5 TABLET ORAL
Qty: 20 TAB | Refills: 0 | Status: SHIPPED | OUTPATIENT
Start: 2018-01-27 | End: 2018-10-04 | Stop reason: ALTCHOICE

## 2018-01-27 RX ADMIN — DIPHENHYDRAMINE HYDROCHLORIDE 25 MG: 25 CAPSULE ORAL at 03:18

## 2018-01-27 RX ADMIN — LEVOFLOXACIN 500 MG: 500 INJECTION, SOLUTION INTRAVENOUS at 05:19

## 2018-01-27 RX ADMIN — HYDROMORPHONE HYDROCHLORIDE 2 MG: 2 TABLET ORAL at 14:50

## 2018-01-27 RX ADMIN — ACETAMINOPHEN 650 MG: 650 SOLUTION ORAL at 17:53

## 2018-01-27 RX ADMIN — HYDROMORPHONE HYDROCHLORIDE 2 MG: 2 INJECTION INTRAMUSCULAR; INTRAVENOUS; SUBCUTANEOUS at 09:12

## 2018-01-27 RX ADMIN — HYDROMORPHONE HYDROCHLORIDE 2 MG: 2 INJECTION INTRAMUSCULAR; INTRAVENOUS; SUBCUTANEOUS at 05:19

## 2018-01-27 RX ADMIN — DIPHENHYDRAMINE HYDROCHLORIDE 25 MG: 25 CAPSULE ORAL at 12:59

## 2018-01-27 RX ADMIN — OXYCODONE HYDROCHLORIDE 10 MG: 5 TABLET ORAL at 03:12

## 2018-01-27 RX ADMIN — HYDROMORPHONE HYDROCHLORIDE 1 MG: 2 INJECTION INTRAMUSCULAR; INTRAVENOUS; SUBCUTANEOUS at 01:25

## 2018-01-27 RX ADMIN — Medication 10 ML: at 05:20

## 2018-01-27 RX ADMIN — ACETAMINOPHEN 650 MG: 650 SOLUTION ORAL at 00:19

## 2018-01-27 RX ADMIN — POTASSIUM CHLORIDE, DEXTROSE MONOHYDRATE AND SODIUM CHLORIDE 125 ML/HR: 150; 5; 450 INJECTION, SOLUTION INTRAVENOUS at 05:20

## 2018-01-27 RX ADMIN — Medication 10 ML: at 00:19

## 2018-01-27 RX ADMIN — OXYCODONE HYDROCHLORIDE 10 MG: 5 TABLET ORAL at 07:45

## 2018-01-27 RX ADMIN — KETOROLAC TROMETHAMINE 30 MG: 30 INJECTION, SOLUTION INTRAMUSCULAR at 13:00

## 2018-01-27 RX ADMIN — ENOXAPARIN SODIUM 40 MG: 40 INJECTION SUBCUTANEOUS at 09:04

## 2018-01-27 RX ADMIN — OXYCODONE HYDROCHLORIDE 10 MG: 5 TABLET ORAL at 11:56

## 2018-01-27 RX ADMIN — Medication 10 ML: at 13:00

## 2018-01-27 NOTE — PROGRESS NOTES
Problem: Falls - Risk of  Goal: *Absence of Falls  Document Giovani Fall Risk and appropriate interventions in the flowsheet.   Outcome: Progressing Towards Goal  Fall Risk Interventions:  Mobility Interventions: Bed/chair exit alarm    Mentation Interventions: Bed/chair exit alarm    Medication Interventions: Bed/chair exit alarm    Elimination Interventions: Call light in reach

## 2018-01-27 NOTE — ANESTHESIA POSTPROCEDURE EVALUATION
Post-Anesthesia Evaluation and Assessment    Patient: Byron Hurst MRN: [id number]  SSN: [id number]    YOB: 1985  Age: 34 y.o. Sex: female       Cardiovascular Function/Vital Signs  Visit Vitals    /69    Pulse (!) 110    Temp 37.4 °C (99.3 °F)    Resp 14    SpO2 99%       Patient is status post general anesthesia for Procedure(s):  VENTRAL HERNIA REPAIR, EXCISION OF ABDOMINAL SKIN, BILATERAL BREAST AUGUMENTATION WITH FULL MASTOPEXY, LIPOSUCTION BILATERAL THIGHS. Nausea/Vomiting: None    Postoperative hydration reviewed and adequate. Pain:  Pain Scale 1: Numeric (0 - 10) (01/26/18 2128)  Pain Intensity 1: 5 (01/26/18 2128)   Managed    Neurological Status:   Neuro (WDL): Exceptions to WDL (01/26/18 2105)  Neuro  Neurologic State: Confused;Drowsy; Eyes open spontaneously; Eyes open to voice; Pharmacologically induced (comment) (01/26/18 2105)  Orientation Level: Oriented to person (01/26/18 2105)  Cognition: Decreased command following (01/26/18 2105)  Speech: Incomprehensible words (01/26/18 2105)  LUE Motor Response: Purposeful (01/26/18 2105)  LLE Motor Response: Purposeful (01/26/18 2105)  RUE Motor Response: Purposeful (01/26/18 2105)  RLE Motor Response: Purposeful (01/26/18 2105)   At baseline    Mental Status and Level of Consciousness: Arousable    Pulmonary Status:   O2 Device: Nasal cannula (01/26/18 2114)   Adequate oxygenation and airway patent    Complications related to anesthesia: None    Post-anesthesia assessment completed.  No concerns    Signed By: Josiah Contreras MD     January 26, 2018

## 2018-01-27 NOTE — PERIOP NOTES
TRANSFER - OUT REPORT:    Verbal report given to [facility] on Xiomy Primer  being transferred to [facility] for routine post - op       Report consisted of patients Situation, Background, Assessment and   Recommendations(SBAR). Information from the following report(s) SBAR, OR Summary, Procedure Summary, Intake/Output and MAR was reviewed with the receiving nurse. Lines:   Peripheral IV 01/26/18 Left Antecubital (Active)   Site Assessment Clean, dry, & intact 1/26/2018  9:23 PM   Phlebitis Assessment 0 1/26/2018  9:23 PM   Infiltration Assessment 0 1/26/2018  9:23 PM   Dressing Status Clean, dry, & intact; Occlusive 1/26/2018  9:23 PM   Dressing Type Tape;Transparent 1/26/2018  9:23 PM   Hub Color/Line Status Pink; Infusing 1/26/2018  9:23 PM   Alcohol Cap Used Yes 1/26/2018  8:40 AM        Opportunity for questions and clarification was provided.       Patient transported with:   O2 @ 2 liters  Registered Nurse

## 2018-01-27 NOTE — PROGRESS NOTES
Bedside and Verbal shift change report given to [address] (oncoming nurse) by Xenia Herrera and Cyn Rendon (offgoing nurse). Report included the following information SBAR, Kardex and Intake/Output.

## 2018-01-27 NOTE — PROGRESS NOTES
Discharge order noted. Spoke with Dr. Montse Mercer around lunchtime. He stated patient looked good and could leave. I communicated with him the difficultly the patient had ambulating and with pain control. Orders changed at that time, see MAR. Dr. Montse Mercer asked me to call if the patient does not feel up to going home. I informed the patient of our conversation. Throughout the day the patient has ambulated many times to the bathroom. Patient states she feels like she is doing better each time. She denies any dizziness. Patient also states the pain is better controlled with the new medication. The patient wanted to eat dinner then discharge. Before her dinner I reviewed her discharge information with her and removed her IV. All questions and concerns addressed. Teach back completed. Patient then informed me she was having a headache. See EMAR. At [address] [facility] patient called me into the room and asked that I get in touch with Dr. Montse Mercer to ask his opinion about her going home. The patient is concerned about her \"massive headache\". The family is concerned with the pain the patient has with ambulation. 1857- Patient and family have now decided she is comfortable discharging. I will escort her out with a wheel chair.

## 2018-01-27 NOTE — BRIEF OP NOTE
BRIEF OPERATIVE NOTE    Date of Procedure: 1/26/2018   Preoperative Diagnosis: VENTRAL HERNIA, COSMETIC  Postoperative Diagnosis: VENTRAL HERNIA, COSMETIC    Procedure(s):  VENTRAL HERNIA REPAIR, EXCISION OF ABDOMINAL SKIN, BILATERAL BREAST AUGUMENTATION WITH FULL MASTOPEXY, LIPOSUCTION BILATERAL THIGHS  Surgeon(s) and Role:     * Richard Castillo MD - Primary         Assistant Staff: None      Surgical Staff:  Circ-1: Destinee Soliman RN; Ulices Saucedo RN  Circ-Relief: Prisca Swanson RN  Scrub Tech-1: Dilcia Bello. Arlene  Scrub Tech-Relief: Denae Curtis  Surg Asst-1: Loida Robbins  Surg Asst-Relief: Sindy Bence, RN  Event Time In   Incision Start 1038   Incision Close 2100     Anesthesia: General   Estimated Blood Loss: 200  Specimens: * No specimens in log *   Findings: Well perfused skin flaps   Complications: None  Implants:   Implant Name Type Inv.  Item Serial No.  Lot No. LRB No. Used Action   275 cc moderate plus mentor gel breast implant   [id number]  [id number] Left 1 Implanted   [facility]                                                                                                                              [id number] MENTOR [id number] Right 1 Implanted

## 2018-01-27 NOTE — DISCHARGE INSTRUCTIONS
Keep dressings dry and in place until follow up. Follow NINI drain care instructions and bring log to follow up appointment. Avoid strenuous activity, pushing, pulling, or heavy lifting. Sleep on back only with head elevated on 3-4 pillows.

## 2018-01-27 NOTE — PROGRESS NOTES
Procedure:  bilateral augment mastopexy, abdominoplasty, hernia repair, liposuction to the thighs    Subjective:  Pain controlled. No nausea. Objective:  Visit Vitals    BP 92/58    Pulse 65    Temp 97.9 °F (36.6 °C)    Resp 16    SpO2 100%       Intake/Output Summary (Last 24 hours) at 01/27/18 1229  Last data filed at 01/27/18 0749   Gross per 24 hour   Intake             7501 ml   Output             1875 ml   Net             5626 ml         Physical Exam:  Breasts- soft, implants in good position, nipples pink  Abdomen- soft, umbilicus/skin flaps pink    Assessment   Looks good today. Skin flaps and nipples are well perfused.        Plan    oob  Diet as tolerated  Keep dressings in place and dry  NINI drain care  D/c later today

## 2018-01-28 NOTE — PROGRESS NOTES
Primary Nurse Veronica Raymundo and Devante Mosley RN performed a dual skin assessment on this patient No impairment noted  Dre score is 20

## 2018-01-29 NOTE — OP NOTES
[facility] 78  OPERATIVE REPORT    Chance Hong  MR#: [id number]  : 1985  ACCOUNT #: [de-identified]   DATE OF SERVICE: 2018    PREOPERATIVE DIAGNOSES:  1. Ventral hernia. 2.  Skin laxity, abdomen. 3.  Bilateral breast ptosis with hypomastia. 4.  Liposis of the thighs. POSTOPERATIVE DIAGNOSES   1. Ventral hernia. 2.  Skin laxity, abdomen. 3.  Bilateral breast ptosis with hypomastia. 4.  Liposis of the thighs. PROCEDURES PERFORMED:  1. Ventral hernia repair. 2.  Excision of excess abdominal skin. 3.  Bilateral breast augmentation with mastopexy. 5.  Liposuction of the medial and lateral thighs. SURGEON:  Minesh Crump MD    ASSISTANTS:    1.  Yara Landin  2. AGUILAR Carranza LNP      ESTIMATED BLOOD LOSS:  100 mL. SPECIMENS REMOVED:  None. COMPLICATIONS:  None. IMPLANTS:  A right breast [place] style 1000 smooth round implant, 250 mL; left breast [place] style 1000 smooth round 275 mL implant. INDICATIONS:  The patient is a 25-year-old woman who presents for cosmetic improvement of her breasts and thighs. In addition, has a history of myofascial laxity and a suspected ventral hernia of the upper abdomen. She presents today for repair as well as excision of excess abdominal skin. PROCEDURE:  After informed consent was obtained, the patient underwent presurgical markings in a standing position. She was marked for a bilateral breast augmentation with a planned vertical lift. It was discussed that she may need a full Almanzar pattern mastopexy based on intraoperative findings. The upper abdominal bulging was identified and a transverse lower abdominal incision was designed and marked to provide access for the repair of myofascial laxity and hernia. The areas of concern with respect to her medial and lateral thighs were identified for liposuction for suction-assisted lipectomy and were marked.   She was then taken to the operating room and placed on the operating table. After adequate general endotracheal anesthesia was induced, attention was first made to the lower extremities for liposuction. The trunk and lower extremities were widely prepped and draped in usual sterile fashion. Suction-assisted lipectomy was performed first of each of the lateral thighs followed by the medial thighs. To address the lateral thighs, the patient was placed in the respective lateral decubitus position to provide proper positioning. Upon completion of the lateral thighs, she was placed in the supine position with her legs in a gentle frogleg position on the bed. The extremities and trunk were prepped circumferentially to allow for mobilization on the table without break in sterile technique. Sequential compression devices were placed on the bilateral lower extremities and covered with sterile stockinettes and Coban. The suction-assisted lipectomy was performed in the same manner for each of the areas. Axis ports were placed adjacent to the areas to be treated. Through these access incisions, a tumescent fluid was infiltrated via tumescent top using an infiltration cannula. The tumescent wetting solution consisted of 1 liter of lactated Ringer's, 50 mL of 1% lidocaine, one amp of epinephrine. Wetting solution was infiltrated until adequate skin turgor was obtained. Suction-assisted lipectomy was then performed using the MicroAire Power-Assisted lipoplasty system. Power generator set at 100%. The 4 mm Mercedes cannulas were used for suctioning at the deeper fatty layers followed by 3 mm Genet cannulas for more superficial liposuction. Suctioning was performed in a radial fashion with gentle palpation of the overlying skin to ensure proper cannula and depth and even contour. A cross sectioning was performed through the various port sites to ensure even contours.   End sites for liposuction were performed based on volume, frequent pinch testing, and overall contour appearance. Upon completion of the liposuction, port sites were closed with 5-0 Prolene simple sutures. Upon completion of the medial thighs, the trunk and breasts were reprepped and draped in the usual sterile fashion. Attention was then made to the breasts. Because of the significant ptosis, a lift was planned. Attention was made to the right breast followed by the left breast.  Each breast was treated in identical fashion. A periareolar incision was marked with a 42 mm cookie cutter. A vertical incision of the planned area of skin excision was designed and marked. Skin incisions were made sharply and carried down to the breast parenchyma with electrocautery. Dissection was directed with electrocautery to the inferior border of the pectoralis major muscle. The inferior border was incised and a subpectoral dissection was performed with the use of a lighted retractor and electrocautery. Once the subpectoral space was entered, a subpectoral dissection was performed using a combination of blunt dissection and electrocautery. The inferior and medial border of the pectoralis major muscle was released with attention at the lateral pectoral insertion. This was released with a combination of electrocautery and gentle blunt finger dissection. At this point, an implant sizer was placed to determine adequate volume. Once the sizer was placed, the mastopexy was designed using the preoperative markings and then using the initial preoperative markings and tailor tacking. Once the sizing was determined, the markings for the markings for the mastopexy were further refined with tailor tacking. It was evident that there was significant lower pole skin after the initial design of the vertical mastopexy. As a result, it was elected to convert to a Wise pattern to address the lower pole dogear.   Once the nipple position, breast shape and volume were found to be symmetric, the patient was placed in the supine position. The staples from tailor tacking were removed and the markings were finalized. The sizers were removed and then the permanent implants placed in identical fashion. The pockets were irrigated with copious amounts of bacitracin, gentamicin and vancomycin solution. All gloves were changed prior to handling the permanent implants. The instruments were arranged with the same triple antibiotic solution. Prior to implant placement, the breast pockets were instilled with a Betadine triple antibiotic solution for several minutes. The 0.25% Marcaine was infiltrated to provide a field block. Hemostasis was obtained with electrocautery and confirmed on direct visualization. Each implant was then placed using a Rodriguez funnel to ensure no touch technique. A [place] style 1000 smooth round moderate plus profile, 250 mL was placed on the right and a [place] style 1000 smooth round moderate plus profile, 275 mL was placed on the left. The breast parenchyma was then reapproximated with interrupted 2-0 PDS sutures. Attention was made to the nipple-areolar complex. A superomedial based pedicle was designed to carry the nipple-areolar complex. The margins of the pedicle were incised in addition to the previously marked periareolar incision. This was de-epithelialized. The remainder of the planned Almanzar pattern markings were then incised. A 2 x 2 advancement flap was designed at the midline of the inframammary incision. This required the triple point closure. The intervening area of central lower pole skin were excised off the underlying parenchyma with electrocautery. These were passed off the field. With gentle upper retraction, skin flaps were elevated in the plane of superficial breast fascia to medial and laterally. Margins of the superior pedicle were incised with electrocautery to facilitate transcranial transposition.   Nipple was transposed and inset into its new position with interrupted 2-0 Monocryl dermal sutures. The superior, medial and lateral skin flaps were transposed to the midline and the vertical breast pillars were approximated using 2-0 PDS sutures to reestablish vertical height. The medial and lateral skin flaps were inset in a V to Y fashion into the inferiorly based cutaneous flap to support the triple point closure. The incisions were then closed. Inframammary incision was closed with interrupted 2-0 Monocryl sutures in the deep dermis, followed by a running 2-0 Monocryl Stratafix suture in the skin. The vertical and periareolar incisions were closed with interrupted 3-0 Monocryl sutures in the deep dermis followed by a running 3-0 Monocryl Stratafix subcuticular suture. As part of determining the nipple position symmetry, a 2-0 silk suture was placed at the xiphoid and the sternal notch as triangulation sutures. These nipple positions were found to be symmetric. Attention was made to the abdomen. A 2-0 silk suture was placed at the pubis. Using the previously placed suture at the xiphoid, silk sutures were used as triangulation sutures to ensure symmetry of the lower transverse abdominal suture. Tumescent solution was infiltrated along the incision to provide local block. Skin incisions were made sharply and then carried down to subcutaneous tissue with electrocautery. With gentle upward traction, a superiorly based skin flap was elevated off of the anterior abdominal wall with electrocautery. Dissection was proceeded from a caudal to cranial direction to the level of the umbilicus. The patient was then placed in the semi-Guillen flexed position and a superiorly based skin flap was transposed. It was readily apparent that the incisions would close after bisecting the lower abdominal skin flap. The patient was placed supine. The lower abdominal skin flap was bisected. A firm upward retraction was placed on the umbilicus.   The periumbilical incision was designed and marked. The incision was made sharply and then the umbilical stalk was carefully dissected from its position and the surrounding tissue. Superior skin flaps were then continued to be elevated in a caudal to cranial direction at the level of xiphoid. Dissection was performed to provide adequate exposure of the underlying bilateral rectus muscles and anterior abdominal wall. It was readily apparent that the patient had a functional ventral hernia of the upper and lower abdomen, worse in the supraumbilical area. Based on its appearance, the edges of the hernia were elevated and then approximated with interrupted 0 Prolene figure-of-eight sutures. An additional plication suture was placed. These were placed both supra- and infraumbilically. An additional plication suture was run from the xiphoid to just superior to the umbilicus and then from the symphysis pubis to the umbilicus. A #1 Prolene was used as the running imbrication suture. Upon completion of the repair, then attention was then made to the skin. The wound was irrigated with copious amounts of bacitracin, gentamicin and vancomycin solution. Hemostasis was accomplished with electrocautery. The patient was placed in the flexed semi-Guillen position. The superiorly based skin flaps were transposed caudally to meet the lower abdominal incision. The excess skin was then marked for excision using the lower transverse abdominal incision as a guide. Following the markings, the excess abdominal skin of the lower abdomen was sharply excised and passed off the field. Skin flaps were temporarily closed with staples and a new umbilical position was designed and marked at the midline by palpation through the overlying skin flaps. A new umbilical aperture was marked.   The aperture was incised according to the markings and then dissection was continued down through the anterior abdominal skin with a combination of sharp scissor dissection electrocautery. The umbilicus was then transposed through the new umbilical aperture and inset with interrupted 3-0 Monocryl sutures in the deep dermis. A 15 Western Kristin Nils drain was placed in the wound and brought out through a separate stab incision in the skin. The lower transverse abdominal incision was then closed in multiple layers. Kaylah fascia was reapproximated with interrupted 2-0 PDS sutures. Dermis was approximated with an Insorb dermal stapler, skin approximated the running 2-0 Monocryl Stratafix suture. Prineo Dermabond was then applied to the incision. Periumbilical incision was closed with interrupted 3-0 Monocryl suture in the deep dermis, followed by a 4-0 Monocryl subcuticular suture in the skin. Skin edges were then covered with Dermabond. The patient tolerated the procedure well. Patient was placed in a postoperative bra and abdominal binder. Legs were wrapped in Ace wrap. The patient tolerated the procedure well, She was extubated in the operating room and taken to recovery room in good condition.       MD ANUPAMA Eason / RN  D: 01/29/2018 14:03     T: 01/29/2018 16:24  JOB #: [id number]

## 2018-06-14 ENCOUNTER — APPOINTMENT (OUTPATIENT)
Dept: ULTRASOUND IMAGING | Age: 33
End: 2018-06-14
Attending: EMERGENCY MEDICINE
Payer: COMMERCIAL

## 2018-06-14 ENCOUNTER — HOSPITAL ENCOUNTER (EMERGENCY)
Age: 33
Discharge: HOME | End: 2018-06-14
Attending: EMERGENCY MEDICINE
Payer: COMMERCIAL

## 2018-06-14 ENCOUNTER — APPOINTMENT (OUTPATIENT)
Dept: CT IMAGING | Age: 33
End: 2018-06-14
Attending: EMERGENCY MEDICINE
Payer: COMMERCIAL

## 2018-06-14 VITALS
SYSTOLIC BLOOD PRESSURE: 102 MMHG | TEMPERATURE: 98.3 F | WEIGHT: 136.91 LBS | BODY MASS INDEX: 25.19 KG/M2 | DIASTOLIC BLOOD PRESSURE: 60 MMHG | RESPIRATION RATE: 16 BRPM | OXYGEN SATURATION: 98 % | HEART RATE: 65 BPM | HEIGHT: 62 IN

## 2018-06-14 DIAGNOSIS — R10.32 ABDOMINAL PAIN, LLQ (LEFT LOWER QUADRANT): Primary | ICD-10-CM

## 2018-06-14 DIAGNOSIS — D64.9 ANEMIA, UNSPECIFIED TYPE: ICD-10-CM

## 2018-06-14 LAB
ALBUMIN SERPL-MCNC: 3.4 G/DL (ref 3.5–5)
ALBUMIN/GLOB SERPL: 1 {RATIO} (ref 1.1–2.2)
ALP SERPL-CCNC: 41 U/L (ref 45–117)
ALT SERPL-CCNC: 25 U/L (ref 12–78)
ANION GAP SERPL CALC-SCNC: 6 MMOL/L (ref 5–15)
APPEARANCE UR: CLEAR
AST SERPL-CCNC: 13 U/L (ref 15–37)
BACTERIA URNS QL MICRO: NEGATIVE /HPF
BASOPHILS # BLD: 0 K/UL (ref 0–0.1)
BASOPHILS NFR BLD: 1 % (ref 0–1)
BILIRUB SERPL-MCNC: 0.4 MG/DL (ref 0.2–1)
BILIRUB UR QL: NEGATIVE
BUN SERPL-MCNC: 24 MG/DL (ref 6–20)
BUN/CREAT SERPL: 35 (ref 12–20)
CALCIUM SERPL-MCNC: 9.3 MG/DL (ref 8.5–10.1)
CHLORIDE SERPL-SCNC: 111 MMOL/L (ref 97–108)
CO2 SERPL-SCNC: 26 MMOL/L (ref 21–32)
COLOR UR: NORMAL
CREAT SERPL-MCNC: 0.69 MG/DL (ref 0.55–1.02)
DIFFERENTIAL METHOD BLD: ABNORMAL
EOSINOPHIL # BLD: 0.2 K/UL (ref 0–0.4)
EOSINOPHIL NFR BLD: 4 % (ref 0–7)
EPITH CASTS URNS QL MICRO: NORMAL /LPF
ERYTHROCYTE [DISTWIDTH] IN BLOOD BY AUTOMATED COUNT: 14.3 % (ref 11.5–14.5)
GLOBULIN SER CALC-MCNC: 3.4 G/DL (ref 2–4)
GLUCOSE SERPL-MCNC: 90 MG/DL (ref 65–100)
GLUCOSE UR STRIP.AUTO-MCNC: NEGATIVE MG/DL
HCG UR QL: NEGATIVE
HCT VFR BLD AUTO: 30 % (ref 35–47)
HGB BLD-MCNC: 10 G/DL (ref 11.5–16)
HGB UR QL STRIP: NEGATIVE
KETONES UR QL STRIP.AUTO: NEGATIVE MG/DL
LEUKOCYTE ESTERASE UR QL STRIP.AUTO: NEGATIVE
LIPASE SERPL-CCNC: 207 U/L (ref 73–393)
LYMPHOCYTES # BLD: 2.5 K/UL (ref 0.8–3.5)
LYMPHOCYTES NFR BLD: 38 % (ref 12–49)
MCH RBC QN AUTO: 29.4 PG (ref 26–34)
MCHC RBC AUTO-ENTMCNC: 33.3 G/DL (ref 30–36.5)
MCV RBC AUTO: 88.2 FL (ref 80–99)
MONOCYTES # BLD: 0.6 K/UL (ref 0–1)
MONOCYTES NFR BLD: 9 % (ref 5–13)
NEUTS SEG # BLD: 3.2 K/UL (ref 1.8–8)
NEUTS SEG NFR BLD: 48 % (ref 32–75)
NITRITE UR QL STRIP.AUTO: NEGATIVE
PH UR STRIP: 6 [PH] (ref 5–8)
PLATELET # BLD AUTO: 213 K/UL (ref 150–400)
PMV BLD AUTO: 10.7 FL (ref 8.9–12.9)
POTASSIUM SERPL-SCNC: 4 MMOL/L (ref 3.5–5.1)
PROT SERPL-MCNC: 6.8 G/DL (ref 6.4–8.2)
PROT UR STRIP-MCNC: NEGATIVE MG/DL
RBC # BLD AUTO: 3.4 M/UL (ref 3.8–5.2)
RBC #/AREA URNS HPF: NORMAL /HPF (ref 0–5)
SODIUM SERPL-SCNC: 143 MMOL/L (ref 136–145)
SP GR UR REFRACTOMETRY: 1.02 (ref 1–1.03)
UR CULT HOLD, URHOLD: NORMAL
UROBILINOGEN UR QL STRIP.AUTO: 0.2 EU/DL (ref 0.2–1)
WBC # BLD AUTO: 6.6 K/UL (ref 3.6–11)
WBC URNS QL MICRO: NORMAL /HPF (ref 0–4)
XXWBCSUS: 0

## 2018-06-14 PROCEDURE — 74177 CT ABD & PELVIS W/CONTRAST: CPT

## 2018-06-14 PROCEDURE — 76830 TRANSVAGINAL US NON-OB: CPT

## 2018-06-14 PROCEDURE — 80053 COMPREHEN METABOLIC PANEL: CPT | Performed by: EMERGENCY MEDICINE

## 2018-06-14 PROCEDURE — 83690 ASSAY OF LIPASE: CPT | Performed by: EMERGENCY MEDICINE

## 2018-06-14 PROCEDURE — 74011250636 HC RX REV CODE- 250/636: Performed by: EMERGENCY MEDICINE

## 2018-06-14 PROCEDURE — 74011636320 HC RX REV CODE- 636/320: Performed by: EMERGENCY MEDICINE

## 2018-06-14 PROCEDURE — 85025 COMPLETE CBC W/AUTO DIFF WBC: CPT | Performed by: EMERGENCY MEDICINE

## 2018-06-14 PROCEDURE — 81001 URINALYSIS AUTO W/SCOPE: CPT | Performed by: EMERGENCY MEDICINE

## 2018-06-14 PROCEDURE — 74011250637 HC RX REV CODE- 250/637: Performed by: EMERGENCY MEDICINE

## 2018-06-14 PROCEDURE — 76856 US EXAM PELVIC COMPLETE: CPT

## 2018-06-14 PROCEDURE — 96374 THER/PROPH/DIAG INJ IV PUSH: CPT

## 2018-06-14 PROCEDURE — 96361 HYDRATE IV INFUSION ADD-ON: CPT

## 2018-06-14 PROCEDURE — 81025 URINE PREGNANCY TEST: CPT

## 2018-06-14 PROCEDURE — 36415 COLL VENOUS BLD VENIPUNCTURE: CPT | Performed by: EMERGENCY MEDICINE

## 2018-06-14 PROCEDURE — 99284 EMERGENCY DEPT VISIT MOD MDM: CPT

## 2018-06-14 PROCEDURE — 96375 TX/PRO/DX INJ NEW DRUG ADDON: CPT

## 2018-06-14 RX ORDER — ACETAMINOPHEN 325 MG/1
1000 TABLET ORAL
COMMUNITY
End: 2018-10-04 | Stop reason: ALTCHOICE

## 2018-06-14 RX ORDER — KETOROLAC TROMETHAMINE 30 MG/ML
30 INJECTION, SOLUTION INTRAMUSCULAR; INTRAVENOUS
Status: COMPLETED | OUTPATIENT
Start: 2018-06-14 | End: 2018-06-14

## 2018-06-14 RX ORDER — ONDANSETRON 2 MG/ML
4 INJECTION INTRAMUSCULAR; INTRAVENOUS
Status: COMPLETED | OUTPATIENT
Start: 2018-06-14 | End: 2018-06-14

## 2018-06-14 RX ORDER — ACETAMINOPHEN 500 MG
1000 TABLET ORAL
Status: COMPLETED | OUTPATIENT
Start: 2018-06-14 | End: 2018-06-14

## 2018-06-14 RX ADMIN — SODIUM CHLORIDE 1000 ML: 900 INJECTION, SOLUTION INTRAVENOUS at 20:32

## 2018-06-14 RX ADMIN — ONDANSETRON 4 MG: 2 INJECTION INTRAMUSCULAR; INTRAVENOUS at 20:37

## 2018-06-14 RX ADMIN — ACETAMINOPHEN 1000 MG: 500 TABLET ORAL at 23:18

## 2018-06-14 RX ADMIN — KETOROLAC TROMETHAMINE 30 MG: 30 INJECTION, SOLUTION INTRAMUSCULAR at 20:37

## 2018-06-14 RX ADMIN — IOPAMIDOL 100 ML: 755 INJECTION, SOLUTION INTRAVENOUS at 22:49

## 2018-06-14 NOTE — ED TRIAGE NOTES
Triage note:  Pt drove self with c/o low abd pain that started this afternoon. Pt has nausea, but has not vomited.

## 2018-06-15 NOTE — ED PROVIDER NOTES
HPI Comments: Flaca Palomares is a 29 yo F with LLQ pain and nausea. She has a history of ovarian cysts, gall stones, cholecystectomy and infectious collitis. She states that she started to have LLQ pain this afternoon. She has had some nausea but no vomiting. She states that she has been told by her gyn that she has ovarian cysts and if she has pain she could be having torsion. She is also worried that she may be developing colitis again but she has not had any diarrhea. She has taken tylenol for her pain but it has not helped. She rates her pain as 7/10. Nothing makes it better or worse. History reviewed. No pertinent past medical history. Past Surgical History:   Procedure Laterality Date    BREAST SURGERY PROCEDURE UNLISTED Left 2000    left breast lymph node removal    HX CHOLECYSTECTOMY  09/2017    HX GI      HX HEENT  2003    Lymph node removed from back of neck    HX HEENT      T & A as a child    HX HYSTERECTOMY  2016    HX OTHER SURGICAL  02/2016    rectocele repaired    HX UROLOGICAL  02/2016    Bladder Sling         History reviewed. No pertinent family history. Social History     Social History    Marital status:      Spouse name: N/A    Number of children: N/A    Years of education: N/A     Occupational History    Not on file. Social History Main Topics    Smoking status: Never Smoker    Smokeless tobacco: Never Used    Alcohol use No    Drug use: No    Sexual activity: Not on file     Other Topics Concern    Not on file     Social History Narrative         ALLERGIES: Pcn [penicillins]    Review of Systems   Constitutional: Negative for fever. HENT: Negative for sore throat. Eyes: Negative for visual disturbance. Respiratory: Negative for cough. Cardiovascular: Negative for chest pain. Gastrointestinal: Positive for abdominal pain and nausea. Negative for diarrhea and vomiting. Genitourinary: Negative for dysuria.    Musculoskeletal: Negative for back pain. Skin: Negative for rash. Neurological: Negative for headaches. Vitals:    06/14/18 1931   BP: 113/62   Pulse: 65   Resp: 16   Temp: 98.3 °F (36.8 °C)   SpO2: 100%   Weight: 62.1 kg (136 lb 14.5 oz)   Height: 5' 2\" (1.575 m)            Physical Exam   Constitutional: She appears well-developed and well-nourished. No distress. HENT:   Head: Normocephalic and atraumatic. Mouth/Throat: Oropharynx is clear and moist.   Eyes: Conjunctivae and EOM are normal.   Neck: Normal range of motion and phonation normal.   Cardiovascular: Normal rate and intact distal pulses. Pulmonary/Chest: Effort normal. No respiratory distress. Abdominal: She exhibits no distension. There is tenderness in the left lower quadrant. There is no rigidity. Musculoskeletal: Normal range of motion. She exhibits no tenderness. Neurological: She is alert. She is not disoriented. She exhibits normal muscle tone. Skin: Skin is warm and dry. Nursing note and vitals reviewed.        MDM      ED Course       Procedures

## 2018-06-15 NOTE — DISCHARGE INSTRUCTIONS
Abdominal Pain: Care Instructions  Your Care Instructions    Abdominal pain has many possible causes. Some aren't serious and get better on their own in a few days. Others need more testing and treatment. If your pain continues or gets worse, you need to be rechecked and may need more tests to find out what is wrong. You may need surgery to correct the problem. Don't ignore new symptoms, such as fever, nausea and vomiting, urination problems, pain that gets worse, and dizziness. These may be signs of a more serious problem. Your doctor may have recommended a follow-up visit in the next 8 to 12 hours. If you are not getting better, you may need more tests or treatment. The doctor has checked you carefully, but problems can develop later. If you notice any problems or new symptoms, get medical treatment right away. Follow-up care is a key part of your treatment and safety. Be sure to make and go to all appointments, and call your doctor if you are having problems. It's also a good idea to know your test results and keep a list of the medicines you take. How can you care for yourself at home? · Rest until you feel better. · To prevent dehydration, drink plenty of fluids, enough so that your urine is light yellow or clear like water. Choose water and other caffeine-free clear liquids until you feel better. If you have kidney, heart, or liver disease and have to limit fluids, talk with your doctor before you increase the amount of fluids you drink. · If your stomach is upset, eat mild foods, such as rice, dry toast or crackers, bananas, and applesauce. Try eating several small meals instead of two or three large ones. · Wait until 48 hours after all symptoms have gone away before you have spicy foods, alcohol, and drinks that contain caffeine. · Do not eat foods that are high in fat. · Avoid anti-inflammatory medicines such as aspirin, ibuprofen (Advil, Motrin), and naproxen (Aleve).  These can cause stomach upset. Talk to your doctor if you take daily aspirin for another health problem. When should you call for help? Call 911 anytime you think you may need emergency care. For example, call if:  ? · You passed out (lost consciousness). ? · You pass maroon or very bloody stools. ? · You vomit blood or what looks like coffee grounds. ? · You have new, severe belly pain. ?Call your doctor now or seek immediate medical care if:  ? · Your pain gets worse, especially if it becomes focused in one area of your belly. ? · You have a new or higher fever. ? · Your stools are black and look like tar, or they have streaks of blood. ? · You have unexpected vaginal bleeding. ? · You have symptoms of a urinary tract infection. These may include:  ¨ Pain when you urinate. ¨ Urinating more often than usual.  ¨ Blood in your urine. ? · You are dizzy or lightheaded, or you feel like you may faint. ? Watch closely for changes in your health, and be sure to contact your doctor if:  ? · You are not getting better after 1 day (24 hours). Where can you learn more? Go to http://hebert-diamond.info/. Enter U372 in the search box to learn more about \"Abdominal Pain: Care Instructions. \"  Current as of: March 20, 2017  Content Version: 11.4  © 5176-1590 [employer]. Care instructions adapted under license by [employer] (which disclaims liability or warranty for this information). If you have questions about a medical condition or this instruction, always ask your healthcare professional. [place] [address] any warranty or liability for your use of this information.

## 2018-10-04 ENCOUNTER — TELEPHONE (OUTPATIENT)
Dept: CARDIOLOGY CLINIC | Age: 33
End: 2018-10-04

## 2018-10-04 ENCOUNTER — OFFICE VISIT (OUTPATIENT)
Dept: CARDIOLOGY CLINIC | Age: 33
End: 2018-10-04

## 2018-10-04 ENCOUNTER — CLINICAL SUPPORT (OUTPATIENT)
Dept: CARDIOLOGY CLINIC | Age: 33
End: 2018-10-04

## 2018-10-04 VITALS
BODY MASS INDEX: 25.58 KG/M2 | WEIGHT: 139 LBS | SYSTOLIC BLOOD PRESSURE: 100 MMHG | OXYGEN SATURATION: 96 % | HEART RATE: 65 BPM | RESPIRATION RATE: 18 BRPM | HEIGHT: 62 IN | DIASTOLIC BLOOD PRESSURE: 60 MMHG

## 2018-10-04 DIAGNOSIS — R00.0 TACHYCARDIA: Primary | ICD-10-CM

## 2018-10-04 NOTE — TELEPHONE ENCOUNTER
Faxed records request for lab results to Dr. Rosa Ramos' office. Lab results were received and given to Dr. Holly Marquez.

## 2018-10-04 NOTE — PROGRESS NOTES
HISTORY OF PRESENT ILLNESS  Tamiko Chavez is a 28 y.o. female     SUMMARY:   Problem List  Date Reviewed: 10/4/2018          Codes Class Noted    Ventral hernia ICD-10-CM: K43.9  ICD-9-CM: 553.20  1/26/2018              No current outpatient prescriptions on file prior to visit. No current facility-administered medications on file prior to visit. CARDIOLOGY STUDIES TO DATE:  5/17   holter rare pacs, nsr, sinus tachycardia        Chief Complaint   Patient presents with    Irregular Heart Beat     HPI :  Ms. Russel Sotomayor is a 28year-old referred by Dr. Liz Rojas for cardiac evaluation. About a year and a half ago, she was noted to have a low heart rate and a Holter was obtained, which I have received and reviewed and summarized above. About three months ago, she began to have episodes of her heart suddenly racing for no more than one to two minutes up to several times per week. When this occurs, she will feel a little short of breath and have some chest pressure. She has always had low blood pressure with mild orthostatic type symptoms. She has a history of thyroid problems, but had blood work for that over the summer and apparently everything was normal.  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 She suffers with headaches. There is no history of hypertension or diabetes. She says her cholesterol has always been good. She has never smoked. Family history is negative for premature coronary disease. CARDIAC ROS:   negative for syncope, orthopnea, paroxysmal nocturnal dyspnea, exertional chest pressure/discomfort, claudication, lower extremity edema    Family History   Problem Relation Age of Onset    Hypertension Father     Heart Disease Neg Hx        No past medical history on file.     GENERAL ROS:  A comprehensive review of systems was negative except for that written in the HPI.    Visit Vitals    /60 (BP 1 Location: Left arm, BP Patient Position: Sitting)    Pulse 65    Resp 18    Ht 5' 2\" (1.575 m)    Wt 139 lb (63 kg)    LMP 09/03/2010    SpO2 96%    BMI 25.42 kg/m2       Wt Readings from Last 3 Encounters:   10/04/18 139 lb (63 kg)   06/14/18 136 lb 14.5 oz (62.1 kg)   01/19/18 138 lb 10.7 oz (62.9 kg)            BP Readings from Last 3 Encounters:   10/04/18 100/60   06/14/18 102/60   01/27/18 95/51       PHYSICAL EXAM  General appearance: alert, cooperative, no distress, appears stated age  Neurologic: Alert and oriented X 3  Neck: supple, symmetrical, trachea midline, no adenopathy, no carotid bruit and no JVD  Lungs: clear to auscultation bilaterally  Heart: regular rate and rhythm, S1, S2 normal, no murmur, click, rub or gallop  Abdomen: soft, non-tender. Bowel sounds normal. No masses,  no organomegaly  Extremities: extremities normal, atraumatic, no cyanosis or edema  Pulses: 2+ and symmetric      ASSESSMENT  It sounds like Ms. Duane Cairo may have some sort of supraventricular tachycardia based on her symptom description. The fact that she can exercise without difficulty is reassuring and in terms of her orthostasis, she simply needs to liberalize her salt intake and drink more water. We are going to provide her with an event monitor to see what shows up there and we will get her recent lab work from her endocrinologist to make sure that is all in order. current treatment plan is effective, no change in therapy  lab results and schedule of future lab studies reviewed with patient  reviewed diet, exercise and weight control    Encounter Diagnoses   Name Primary?  Tachycardia Yes     No orders of the defined types were placed in this encounter. Follow-up Disposition:  Return in about 4 weeks (around 11/1/2018).     Kalpana Ambrosio MD  10/4/2018

## 2018-10-04 NOTE — MR AVS SNAPSHOT
[address] [facility] 
[phone] Patient: Kasey Lombard MRN: [id number] YMF:[birth year] Visit Information Date & Time Provider Department Dept. Phone Encounter #  
 10/4/2018  1:40 PM Sharita Bernabe MD [facility] [phone] [id number] Follow-up Instructions Return in about 4 weeks (around 11/1/2018). Your Appointments 11/20/2018  1:40 PM  
ESTABLISHED PATIENT with Sharita Bernabe MD  
[facility] ([facility]) Appt Note: 6 wk f/u per Dr. Bryce Ngo [address]  [address] [facility]  
[address] [place] [address] Upcoming Health Maintenance Date Due DTaP/Tdap/Td series (1 - Tdap) 4/2/2005 PAP AKA CERVICAL CYTOLOGY 4/2/2005 Influenza Age 5 to Adult 8/1/2018 Allergies as of 10/4/2018  Review Complete On: 10/4/2018 By: Sharita Bernabe MD  
  
 Severity Noted Reaction Type Reactions Pcn [Penicillins] High 09/03/2010   Systemic Anaphylaxis Current Immunizations  Never Reviewed No immunizations on file. Not reviewed this visit You Were Diagnosed With   
  
 Codes Comments Tachycardia    -  Primary ICD-10-CM: R00.0 ICD-9-CM: 870. 0 Vitals BP Pulse Resp Height(growth percentile) Weight(growth percentile) LMP  
 100/60 (BP 1 Location: Left arm, BP Patient Position: Sitting) 65 18 5' 2\" (1.575 m) 139 lb (63 kg) 09/03/2010 SpO2 BMI OB Status Smoking Status 96% 25.42 kg/m2 Hysterectomy Never Smoker Vitals History BMI and BSA Data Body Mass Index Body Surface Area  
 25.42 kg/m 2 1.66 m 2 Your Updated Medication List  
  
Notice  As of 10/4/2018  4:08 PM  
 You have not been prescribed any medications. Follow-up Instructions Return in about 4 weeks (around 11/1/2018). To-Do List   
 10/04/2018 Cardiac Services:  LOOP MONITOR Introducing [facility]! Dear Rocío Morning: Thank you for requesting a [employer] account. Our records indicate that you already have an active [employer] account. You can access your account anytime at [email or web address] Did you know that you can access your hospital and ER discharge instructions at any time in Capical? You can also review all of your test results from your hospital stay or ER visit. Additional Information If you have questions, please visit the Frequently Asked Questions section of the Capical website at [email or web address] Remember, Capical is NOT to be used for urgent needs. For medical emergencies, dial 911. Now available from your iPhone and Android! Please provide this summary of care documentation to your next provider. Your primary care clinician is listed as Teresa Dee. If you have any questions after today's visit, please call [phone].

## 2018-10-09 ENCOUNTER — TELEPHONE (OUTPATIENT)
Dept: CARDIOLOGY CLINIC | Age: 33
End: 2018-10-09

## 2018-10-09 NOTE — TELEPHONE ENCOUNTER
Called patient. Verified patient's identity with two identifiers. Patient states she called the # given to her to check if her insurance would cover monitor. She was told the company was not in her network. She has received the monitor, but has not put it on yet. I told her Dr. Karly Glaser said we could try for a 48 hour monitor. Patient states she would rather wear the 30 day monitor because sometimes she may go 2 days without feeling the sxs. She would rather pay for the 30 day monitor than pay several office co-pays trying to get results with the 48 hour monitor. She may go ahead and start wearing 30 day monitor, but I told her I would check with someone to see if they had any more information about this not being covered. I told her I'd call her back.

## 2018-10-09 NOTE — TELEPHONE ENCOUNTER
Pt called to discuss other options after finding out her insurance doesn't cover [employer] heart monitor.   Phone # [phone]  Thanks

## 2018-10-10 NOTE — TELEPHONE ENCOUNTER
LM on patient's voicemail stating I was told she could try calling main # to [employer] and asking for billing. They may be able to deduct price of monitor. Otherwise, she can return it and will not have to pay for it.

## 2018-10-19 ENCOUNTER — TELEPHONE (OUTPATIENT)
Dept: CARDIOLOGY CLINIC | Age: 33
End: 2018-10-19

## 2018-10-19 NOTE — TELEPHONE ENCOUNTER
Called patient. Verified patient's identity with two identifiers. Notified patient Dr. Fili Martinez said this was fine. Patient verbalizes understanding and denies further questions or concerns.

## 2018-10-19 NOTE — TELEPHONE ENCOUNTER
Patient stated her endo dr put her on spironolactone 25mg but she would like to be sure that this is okay   Phone: [phone]

## 2018-11-20 ENCOUNTER — OFFICE VISIT (OUTPATIENT)
Dept: CARDIOLOGY CLINIC | Age: 33
End: 2018-11-20

## 2018-11-20 VITALS
DIASTOLIC BLOOD PRESSURE: 60 MMHG | WEIGHT: 143 LBS | OXYGEN SATURATION: 98 % | BODY MASS INDEX: 26.31 KG/M2 | HEART RATE: 73 BPM | HEIGHT: 62 IN | SYSTOLIC BLOOD PRESSURE: 100 MMHG | RESPIRATION RATE: 16 BRPM

## 2018-11-20 DIAGNOSIS — I95.1 ORTHOSTATIC HYPOTENSION: ICD-10-CM

## 2018-11-20 DIAGNOSIS — R00.0 TACHYCARDIA: Primary | ICD-10-CM

## 2018-11-20 RX ORDER — SPIRONOLACTONE 25 MG/1
100 TABLET ORAL 2 TIMES DAILY
Refills: 4 | COMMUNITY
Start: 2018-10-19

## 2018-11-20 NOTE — PROGRESS NOTES
HISTORY OF PRESENT ILLNESS  Tamiko Chavez is a 28 y.o. female     SUMMARY:   Problem List  Date Reviewed: 11/20/2018          Codes Class Noted    Ventral hernia ICD-10-CM: K43.9  ICD-9-CM: 553.20  1/26/2018              Current Outpatient Medications on File Prior to Visit   Medication Sig    spironolactone (ALDACTONE) 25 mg tablet TAKE 1 TABLET BY MOUTH TWICE A DAY     No current facility-administered medications on file prior to visit. CARDIOLOGY STUDIES TO DATE:  5/17   holter rare pacs, nsr, sinus tachycardia  10/18 event monitor, multiple episodes pvcs associated with sob, cp, and heart fluttering      Chief Complaint   Patient presents with    Rapid Heart Rate     HPI :  Ms. Samantha Fowler orthostatic type symptoms are better with liberalization of salt and fluid, but she is still having lots of palpitations. We reviewed her event monitor, which did not show any tachycardia, just symptomatic PVCs. Lots of things going on at home. Her son apparently has come down with a seizure disorder. She is not sleeping well and she has to have some gynecological surgery in a few weeks as well. She is still exercising regularly with no worrisome symptoms. CARDIAC ROS:   negative for syncope, orthopnea, paroxysmal nocturnal dyspnea, exertional chest pressure/discomfort, claudication, lower extremity edema    Family History   Problem Relation Age of Onset    Hypertension Father     Heart Disease Neg Hx        No past medical history on file. GENERAL ROS:  A comprehensive review of systems was negative except for that written in the HPI.     Visit Vitals  /60 (BP 1 Location: Left arm, BP Patient Position: Sitting)   Pulse 73   Resp 16   Ht 5' 2\" (1.575 m)   Wt 143 lb (64.9 kg)   LMP 09/03/2010   SpO2 98%   BMI 26.16 kg/m²       Wt Readings from Last 3 Encounters:   11/20/18 143 lb (64.9 kg)   10/04/18 139 lb (63 kg)   06/14/18 136 lb 14.5 oz (62.1 kg)            BP Readings from Last 3 Encounters:   11/20/18 100/60   10/04/18 100/60   06/14/18 102/60       PHYSICAL EXAM  General appearance: alert, cooperative, no distress, appears stated age  Neurologic: Alert and oriented X 3  Neck: supple, symmetrical, trachea midline, no adenopathy, no carotid bruit and no JVD  Lungs: clear to auscultation bilaterally  Heart: regular rate and rhythm, S1, S2 normal, no murmur, click, rub or gallop  Extremities: extremities normal, atraumatic, no cyanosis or edema      ASSESSMENT  Ms. Richar Richardson has symptomatic PVCs in the absence of any symptoms suggestive of structural or ischemic heart disease and no real cardiac risk factors. We talked about use of a beta-blocker for symptom control and right now, she does not want to take any more medicines or feel any more tired than she already does. We talked about potential stressors and other things that could be contributory, but at this point in her life, I do not think she is going to be able to do much to control that. I told her that at any point she wanted to try Toprol XL 25 mg at night, she could simply call in and we would give her a prescription. current treatment plan is effective, no change in therapy  lab results and schedule of future lab studies reviewed with patient  reviewed diet, exercise and weight control    Encounter Diagnoses   Name Primary?  Tachycardia Yes    Orthostatic hypotension      Orders Placed This Encounter    spironolactone (ALDACTONE) 25 mg tablet       Follow-up Disposition:  Return in about 3 months (around 2/20/2019).     Norma Pichardo MD  11/20/2018

## 2019-03-28 ENCOUNTER — OFFICE VISIT (OUTPATIENT)
Dept: CARDIOLOGY CLINIC | Age: 34
End: 2019-03-28

## 2019-03-28 VITALS
OXYGEN SATURATION: 98 % | HEIGHT: 62 IN | WEIGHT: 146 LBS | SYSTOLIC BLOOD PRESSURE: 104 MMHG | BODY MASS INDEX: 26.87 KG/M2 | HEART RATE: 68 BPM | DIASTOLIC BLOOD PRESSURE: 60 MMHG

## 2019-03-28 DIAGNOSIS — I95.1 ORTHOSTATIC HYPOTENSION: ICD-10-CM

## 2019-03-28 DIAGNOSIS — I49.3 PVC'S (PREMATURE VENTRICULAR CONTRACTIONS): Primary | ICD-10-CM

## 2019-03-28 DIAGNOSIS — R07.89 OTHER CHEST PAIN: ICD-10-CM

## 2019-03-28 DIAGNOSIS — R00.2 PALPITATIONS: ICD-10-CM

## 2019-03-28 NOTE — PROGRESS NOTES
HISTORY OF PRESENT ILLNESS  Vadim Moon is a 28 y.o. female     SUMMARY:   Problem List  Date Reviewed: 3/27/2019          Codes Class Noted    Ventral hernia ICD-10-CM: K43.9  ICD-9-CM: 553.20  1/26/2018              Current Outpatient Medications on File Prior to Visit   Medication Sig    spironolactone (ALDACTONE) 25 mg tablet TAKE 1 TABLET BY MOUTH TWICE A DAY     No current facility-administered medications on file prior to visit. CARDIOLOGY STUDIES TO DATE:  5/17   holter rare pacs, nsr, sinus tachycardia   10/18 event monitor, multiple episodes pvcs associated with sob, cp, and heart fluttering        Chief Complaint   Patient presents with    Irregular Heart Beat     HPI :  Ms. Shai Zavala continues to have some orthostatic symptoms and inappropriate tachycardia with some presyncope, but no fainting spells. She has now noticed that when she exercises at times she gets some chest pain. Other times, she does not. She still does not want to take any beta-blockers or other medications. CARDIAC ROS:   negative for dyspnea, orthopnea, paroxysmal nocturnal dyspnea, exertional chest pressure/discomfort, claudication, lower extremity edema    Family History   Problem Relation Age of Onset    Hypertension Father     Heart Disease Neg Hx        No past medical history on file. GENERAL ROS:  A comprehensive review of systems was negative except for that written in the HPI.     Visit Vitals  /60   Pulse 68   Ht 5' 2\" (1.575 m)   Wt 146 lb (66.2 kg)   LMP 09/03/2010   SpO2 98%   BMI 26.70 kg/m²       Wt Readings from Last 3 Encounters:   03/28/19 146 lb (66.2 kg)   11/20/18 143 lb (64.9 kg)   10/04/18 139 lb (63 kg)            BP Readings from Last 3 Encounters:   03/28/19 104/60   11/20/18 100/60   10/04/18 100/60       PHYSICAL EXAM  General appearance: alert, cooperative, no distress, appears stated age  Neurologic: Alert and oriented X 3  Neck: supple, symmetrical, trachea midline, no adenopathy, no carotid bruit and no JVD  Lungs: clear to auscultation bilaterally  Heart: regular rate and rhythm, S1, S2 normal, no murmur, click, rub or gallop  Extremities: extremities normal, atraumatic, no cyanosis or edema      ASSESSMENT  Ms. Serrano's chest pain is fairly atypical in the setting of no risk factors, but that being said she wants to be able to continue to exercise, so we are going to do a treadmill test on her. It will be interesting to see what her blood pressure response is given her history of presyncope as well. I mentioned she might consider support stockings prior to exercise as well to see if that makes any difference in controlling her symptoms. current treatment plan is effective, no change in therapy  lab results and schedule of future lab studies reviewed with patient  reviewed diet, exercise and weight control    Encounter Diagnoses   Name Primary?  PVC's (premature ventricular contractions) Yes    Orthostatic hypotension     Palpitations      No orders of the defined types were placed in this encounter. Follow-up and Dispositions    · Return in about 3 months (around 6/28/2019).          Les Rasmussen MD  3/28/2019

## 2019-04-03 ENCOUNTER — TELEPHONE (OUTPATIENT)
Dept: CARDIOLOGY CLINIC | Age: 34
End: 2019-04-03

## 2019-04-03 NOTE — TELEPHONE ENCOUNTER
Called patient. Verified patient's identity with two identifiers. Notified patient of results and Dr. Vicki Reardon message. Patient verbalized understanding and denied further questions or concerns.

## 2019-04-03 NOTE — TELEPHONE ENCOUNTER
----- Message from Vidhya Sams MD sent at 4/3/2019  4:58 PM EDT -----  Stress test is normal, no evidence of blockages as cause for symptoms.  Looks great

## 2020-03-17 ENCOUNTER — HOSPITAL ENCOUNTER (OUTPATIENT)
Dept: CT IMAGING | Age: 35
Discharge: HOME | End: 2020-03-17
Attending: INTERNAL MEDICINE
Payer: COMMERCIAL

## 2020-03-17 DIAGNOSIS — R10.13 EPIGASTRIC ABDOMINAL PAIN: ICD-10-CM

## 2020-03-17 DIAGNOSIS — R11.0 NAUSEA: ICD-10-CM

## 2020-03-17 DIAGNOSIS — K52.9 ENTERITIS: ICD-10-CM

## 2020-03-17 PROCEDURE — 74011636320 HC RX REV CODE- 636/320: Performed by: RADIOLOGY

## 2020-03-17 PROCEDURE — 74177 CT ABD & PELVIS W/CONTRAST: CPT

## 2020-03-17 RX ADMIN — IOPAMIDOL 100 ML: 755 INJECTION, SOLUTION INTRAVENOUS at 10:31

## 2022-03-19 PROBLEM — K43.9 VENTRAL HERNIA: Status: ACTIVE | Noted: 2018-01-26

## 2022-09-08 ENCOUNTER — HOSPITAL ENCOUNTER (EMERGENCY)
Age: 37
Discharge: HOME | End: 2022-09-09
Attending: EMERGENCY MEDICINE
Payer: COMMERCIAL

## 2022-09-08 VITALS
BODY MASS INDEX: 26.57 KG/M2 | OXYGEN SATURATION: 98 % | WEIGHT: 144.4 LBS | HEIGHT: 62 IN | HEART RATE: 72 BPM | RESPIRATION RATE: 16 BRPM | SYSTOLIC BLOOD PRESSURE: 119 MMHG | DIASTOLIC BLOOD PRESSURE: 70 MMHG | TEMPERATURE: 98 F

## 2022-09-08 DIAGNOSIS — S81.811A LACERATION OF RIGHT LOWER EXTREMITY, INITIAL ENCOUNTER: Primary | ICD-10-CM

## 2022-09-08 PROCEDURE — 90714 TD VACC NO PRESV 7 YRS+ IM: CPT | Performed by: EMERGENCY MEDICINE

## 2022-09-08 PROCEDURE — 74011000250 HC RX REV CODE- 250: Performed by: EMERGENCY MEDICINE

## 2022-09-08 PROCEDURE — 75810000293 HC SIMP/SUPERF WND  RPR

## 2022-09-08 PROCEDURE — 99284 EMERGENCY DEPT VISIT MOD MDM: CPT

## 2022-09-08 PROCEDURE — 74011250636 HC RX REV CODE- 250/636: Performed by: EMERGENCY MEDICINE

## 2022-09-08 PROCEDURE — 90471 IMMUNIZATION ADMIN: CPT

## 2022-09-08 RX ORDER — LIDOCAINE HYDROCHLORIDE AND EPINEPHRINE 10; 10 MG/ML; UG/ML
1.5 INJECTION, SOLUTION INFILTRATION; PERINEURAL ONCE
Status: COMPLETED | OUTPATIENT
Start: 2022-09-08 | End: 2022-09-08

## 2022-09-08 RX ADMIN — TETANUS AND DIPHTHERIA TOXOIDS ADSORBED 0.5 ML: 2; 2 INJECTION INTRAMUSCULAR at 23:28

## 2022-09-08 RX ADMIN — LIDOCAINE HYDROCHLORIDE,EPINEPHRINE BITARTRATE 15 MG: 10; .01 INJECTION, SOLUTION INFILTRATION; PERINEURAL at 23:27

## 2022-09-09 PROCEDURE — 75810000293 HC SIMP/SUPERF WND  RPR

## 2022-09-09 NOTE — ED PROVIDER NOTES
Date of Service:  9/8/2022    Patient:  Parminder Riggs    Chief Complaint:  Laceration       HPI:  Parminder Riggs is a 44 y.o.  female who presents for evaluation of laceration. Patient was carrying a trash bag that had a knife in it. Apparently the knife cut the patient's leg. She comes in with a laceration to right lateral calf. Patient's tetanus is out of date. She comes in with a small laceration to that area. No other complaints       History reviewed. No pertinent past medical history. Past Surgical History:   Procedure Laterality Date    HX CHOLECYSTECTOMY  09/2017    HX GI      HX HEENT  2003    Lymph node removed from back of neck    HX HEENT      T & A as a child    HX HYSTERECTOMY  2016    HX OTHER SURGICAL  02/2016    rectocele repaired    HX UROLOGICAL  02/2016    Bladder Sling    [facility] BREAST SURGERY PROCEDURE UNLISTED Left 2000    left breast lymph node removal         Family History:   Problem Relation Age of Onset    Hypertension Father     Heart Disease Neg Hx        Social History     Socioeconomic History    Marital status:      Spouse name: Not on file    Number of children: Not on file    Years of education: Not on file    Highest education level: Not on file   Occupational History    Not on file   Tobacco Use    Smoking status: Never    Smokeless tobacco: Never   Substance and Sexual Activity    Alcohol use: No    Drug use: No    Sexual activity: Not on file   Other Topics Concern    Not on file   Social History Narrative    Not on file     Social Determinants of Health     Financial Resource Strain: Not on file   Food Insecurity: Not on file   Transportation Needs: Not on file   Physical Activity: Not on file   Stress: Not on file   Social Connections: Not on file   Intimate Partner Violence: Not on file   Housing Stability: Not on file         ALLERGIES: Pcn [penicillins]    Review of Systems   Skin:  Positive for wound.    All other systems reviewed and are negative. Vitals:    09/08/22 2235   BP: 119/70   Pulse: 72   Resp: 16   Temp: 98 °F (36.7 °C)   SpO2: 98%   Weight: 65.5 kg (144 lb 6.4 oz)   Height: 5' 2\" (1.575 m)            Physical Exam  Vitals and nursing note reviewed. Constitutional:       Appearance: Normal appearance. HENT:      Nose: Nose normal.   Eyes:      General: No scleral icterus. Cardiovascular:      Rate and Rhythm: Normal rate. Pulmonary:      Effort: Pulmonary effort is normal.   Abdominal:      General: Abdomen is flat. Musculoskeletal:         General: No deformity. Skin:     General: Skin is warm. Capillary Refill: Capillary refill takes less than 2 seconds. Comments: 1-1/2 inch partial-thickness laceration to the right lateral calf   Neurological:      Mental Status: She is alert and oriented to person, place, and time. Psychiatric:         Mood and Affect: Mood normal.        MDM     VITAL SIGNS:  No data found. LABS:  No results found for this or any previous visit (from the past 6 hour(s)). IMAGING:  No orders to display         Medications During Visit:  Medications   tetanus-diphtheria toxoids-Td (Td) 2-2 Lf unit/0.5 mL injection 0.5 mL (0.5 mL IntraMUSCular Given 9/8/22 5142)   lidocaine-EPINEPHrine (XYLOCAINE) 1 %-1:100,000 injection 15 mg (15 mg IntraDERMal Given 9/8/22 8622)         DECISION MAKING:  Alexia Kapoor is a 44 y.o. female who comes in as above. Small laceration repaired with 3 sutures. Patient's wound is cleaned and tetanus is updated patient is discharged home      IMPRESSION:  1.  Laceration of right lower extremity, initial encounter        DISPOSITION:  Discharged      Discharge Medication List as of 9/9/2022 12:10 AM           Follow-up Information       Follow up With Specialties Details Why Contact Info    Chloe Holt MD Family Medicine  For suture removal 7 days [facility] [phone]                The patient is asked to follow-up with their primary care provider in the next several days. They are to call tomorrow for an appointment. The patient is asked to return promptly for any increased concerns or worsening of symptoms. They can return to this emergency department or any other emergency department.       Wound Closure by Adhesive    Date/Time: 9/9/2022 3:24 AM  Performed by: Fabi Naqvi DO  Authorized by: Fabi Naqvi DO     Consent:     Consent obtained:  Verbal    Consent given by:  Patient    Risks discussed:  Infection, pain and poor cosmetic result  Universal protocol:     Patient identity confirmed:  Verbally with patient  Anesthesia:     Anesthesia method:  Local infiltration    Local anesthetic:  Lidocaine 1% WITH epi  Laceration details:     Location:  Leg    Leg location:  R lower leg    Length (cm):  2    Depth (mm):  1  Treatment:     Area cleansed with:  Marla-Clens    Amount of cleaning:  Standard  Skin repair:     Repair method:  Sutures    Suture size:  4-0    Suture material:  Nylon    Suture technique:  Simple interrupted    Number of sutures:  3  Approximation:     Approximation:  Close  Repair type:     Repair type:  Simple  Post-procedure details:     Dressing:  Open (no dressing)    Procedure completion:  Tolerated well, no immediate complications
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
## 2022-09-09 NOTE — ED NOTES
The patient was discharged home by provider and Bowen Jackson RN in stable condition . The patient is alert and oriented, is in no respiratory distress and has vital signs within normal limits . The patient's diagnosis, condition and treatment were explained to patient or parent/guardian. The patient/responsible party expressed understanding. No prescriptions given to pt. No work/school note given to pt. A discharge plan has been developed. A  was not involved in the process. Aftercare instructions were given to the patient. Wrapped with nonadherent dressing and ace bandage. Pt to transport herself home.

## 2024-09-09 ENCOUNTER — OFFICE VISIT (OUTPATIENT)
Age: 39
End: 2024-09-09
Payer: COMMERCIAL

## 2024-09-09 VITALS — HEIGHT: 61 IN | WEIGHT: 144 LBS | BODY MASS INDEX: 27.19 KG/M2

## 2024-09-09 DIAGNOSIS — N60.12 FIBROCYSTIC BREAST CHANGES OF BOTH BREASTS: Primary | ICD-10-CM

## 2024-09-09 DIAGNOSIS — Z12.31 ENCOUNTER FOR SCREENING MAMMOGRAM FOR BREAST CANCER: ICD-10-CM

## 2024-09-09 DIAGNOSIS — N60.11 FIBROCYSTIC BREAST CHANGES OF BOTH BREASTS: Primary | ICD-10-CM

## 2024-09-09 PROCEDURE — 99203 OFFICE O/P NEW LOW 30 MIN: CPT | Performed by: NURSE PRACTITIONER

## 2024-09-09 RX ORDER — SPIRONOLACTONE 100 MG/1
TABLET, FILM COATED ORAL
COMMUNITY

## 2024-09-09 RX ORDER — TOPIRAMATE 200 MG/1
CAPSULE, EXTENDED RELEASE ORAL
COMMUNITY

## 2024-09-09 RX ORDER — DULOXETIN HYDROCHLORIDE 20 MG/1
CAPSULE, DELAYED RELEASE ORAL
COMMUNITY
Start: 2023-08-24

## 2024-11-16 ENCOUNTER — HOSPITAL ENCOUNTER (EMERGENCY)
Facility: HOSPITAL | Age: 39
Discharge: HOME | End: 2024-11-16
Attending: EMERGENCY MEDICINE
Payer: COMMERCIAL

## 2024-11-16 ENCOUNTER — APPOINTMENT (OUTPATIENT)
Facility: HOSPITAL | Age: 39
End: 2024-11-16
Payer: COMMERCIAL

## 2024-11-16 VITALS
TEMPERATURE: 98 F | DIASTOLIC BLOOD PRESSURE: 86 MMHG | HEART RATE: 100 BPM | OXYGEN SATURATION: 100 % | RESPIRATION RATE: 22 BRPM | WEIGHT: 135 LBS | SYSTOLIC BLOOD PRESSURE: 128 MMHG | BODY MASS INDEX: 25.51 KG/M2

## 2024-11-16 DIAGNOSIS — S93.402A SPRAIN OF LEFT ANKLE, UNSPECIFIED LIGAMENT, INITIAL ENCOUNTER: Primary | ICD-10-CM

## 2024-11-16 PROCEDURE — 73610 X-RAY EXAM OF ANKLE: CPT

## 2024-11-16 PROCEDURE — 99283 EMERGENCY DEPT VISIT LOW MDM: CPT

## 2024-11-16 PROCEDURE — 73590 X-RAY EXAM OF LOWER LEG: CPT

## 2024-11-16 PROCEDURE — 6370000000 HC RX 637 (ALT 250 FOR IP): Performed by: EMERGENCY MEDICINE

## 2024-11-16 RX ORDER — IBUPROFEN 600 MG/1
600 TABLET, FILM COATED ORAL
Status: COMPLETED | OUTPATIENT
Start: 2024-11-16 | End: 2024-11-16

## 2024-11-16 RX ORDER — ONDANSETRON 4 MG/1
4 TABLET, ORALLY DISINTEGRATING ORAL ONCE
Status: COMPLETED | OUTPATIENT
Start: 2024-11-16 | End: 2024-11-16

## 2024-11-16 RX ADMIN — IBUPROFEN 600 MG: 600 TABLET, FILM COATED ORAL at 22:47

## 2024-11-16 RX ADMIN — ONDANSETRON 4 MG: 4 TABLET, ORALLY DISINTEGRATING ORAL at 22:47

## 2024-11-16 ASSESSMENT — LIFESTYLE VARIABLES
HOW MANY STANDARD DRINKS CONTAINING ALCOHOL DO YOU HAVE ON A TYPICAL DAY: PATIENT DOES NOT DRINK
HOW OFTEN DO YOU HAVE A DRINK CONTAINING ALCOHOL: NEVER

## 2024-11-16 ASSESSMENT — PAIN SCALES - GENERAL: PAINLEVEL_OUTOF10: 9

## 2024-11-16 ASSESSMENT — PAIN DESCRIPTION - LOCATION: LOCATION: ANKLE

## 2024-11-16 ASSESSMENT — PAIN DESCRIPTION - ORIENTATION: ORIENTATION: LEFT
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
## 2024-11-17 NOTE — ED TRIAGE NOTES
Pt states playing soccer, something happened to their left ankle, patient stated they heard a \"snap\"

## 2025-01-05 ENCOUNTER — HOSPITAL ENCOUNTER (EMERGENCY)
Facility: HOSPITAL | Age: 40
Discharge: HOME | End: 2025-01-06
Attending: EMERGENCY MEDICINE
Payer: COMMERCIAL

## 2025-01-05 VITALS
TEMPERATURE: 97.4 F | DIASTOLIC BLOOD PRESSURE: 78 MMHG | HEART RATE: 75 BPM | RESPIRATION RATE: 16 BRPM | SYSTOLIC BLOOD PRESSURE: 131 MMHG | OXYGEN SATURATION: 99 %

## 2025-01-05 DIAGNOSIS — M54.2 NECK PAIN: Primary | ICD-10-CM

## 2025-01-05 LAB
ALBUMIN SERPL-MCNC: 4.1 G/DL (ref 3.5–5)
ALBUMIN/GLOB SERPL: 1.2 (ref 1.1–2.2)
ALP SERPL-CCNC: 73 U/L (ref 45–117)
ALT SERPL-CCNC: 23 U/L (ref 12–78)
ANION GAP SERPL CALC-SCNC: 7 MMOL/L (ref 2–12)
AST SERPL-CCNC: 17 U/L (ref 15–37)
BASOPHILS # BLD: 0.1 K/UL (ref 0–0.1)
BASOPHILS NFR BLD: 1 % (ref 0–1)
BILIRUB SERPL-MCNC: 0.4 MG/DL (ref 0.2–1)
BUN SERPL-MCNC: 16 MG/DL (ref 6–20)
BUN/CREAT SERPL: 19 (ref 12–20)
CALCIUM SERPL-MCNC: 9.2 MG/DL (ref 8.5–10.1)
CHLORIDE SERPL-SCNC: 101 MMOL/L (ref 97–108)
CO2 SERPL-SCNC: 28 MMOL/L (ref 21–32)
CREAT SERPL-MCNC: 0.85 MG/DL (ref 0.55–1.02)
CRP SERPL-MCNC: 0.56 MG/DL (ref 0–0.3)
DIFFERENTIAL METHOD BLD: ABNORMAL
EOSINOPHIL # BLD: 0 K/UL (ref 0–0.4)
EOSINOPHIL NFR BLD: 0 % (ref 0–7)
ERYTHROCYTE [DISTWIDTH] IN BLOOD BY AUTOMATED COUNT: 12 % (ref 11.5–14.5)
ERYTHROCYTE [SEDIMENTATION RATE] IN BLOOD: 26 MM/HR (ref 0–20)
GLOBULIN SER CALC-MCNC: 3.4 G/DL (ref 2–4)
GLUCOSE SERPL-MCNC: 128 MG/DL (ref 65–100)
HCT VFR BLD AUTO: 33.7 % (ref 35–47)
HGB BLD-MCNC: 11.6 G/DL (ref 11.5–16)
IMM GRANULOCYTES # BLD AUTO: 0.1 K/UL (ref 0–0.04)
IMM GRANULOCYTES NFR BLD AUTO: 1 % (ref 0–0.5)
LYMPHOCYTES # BLD: 0.6 K/UL (ref 0.8–3.5)
LYMPHOCYTES NFR BLD: 6 % (ref 12–49)
MCH RBC QN AUTO: 28.8 PG (ref 26–34)
MCHC RBC AUTO-ENTMCNC: 34.4 G/DL (ref 30–36.5)
MCV RBC AUTO: 83.6 FL (ref 80–99)
MONOCYTES # BLD: 0.2 K/UL (ref 0–1)
MONOCYTES NFR BLD: 2 % (ref 5–13)
NEUTS SEG # BLD: 9.1 K/UL (ref 1.8–8)
NEUTS SEG NFR BLD: 90 % (ref 32–75)
NRBC # BLD: 0 K/UL (ref 0–0.01)
NRBC BLD-RTO: 0 PER 100 WBC
PLATELET # BLD AUTO: 291 K/UL (ref 150–400)
PMV BLD AUTO: 9.8 FL (ref 8.9–12.9)
POTASSIUM SERPL-SCNC: 4.4 MMOL/L (ref 3.5–5.1)
PROT SERPL-MCNC: 7.5 G/DL (ref 6.4–8.2)
RBC # BLD AUTO: 4.03 M/UL (ref 3.8–5.2)
RBC MORPH BLD: ABNORMAL
SODIUM SERPL-SCNC: 136 MMOL/L (ref 136–145)
WBC # BLD AUTO: 10.1 K/UL (ref 3.6–11)

## 2025-01-05 PROCEDURE — 99284 EMERGENCY DEPT VISIT MOD MDM: CPT

## 2025-01-05 PROCEDURE — 96372 THER/PROPH/DIAG INJ SC/IM: CPT

## 2025-01-05 PROCEDURE — 86140 C-REACTIVE PROTEIN: CPT

## 2025-01-05 PROCEDURE — 85652 RBC SED RATE AUTOMATED: CPT

## 2025-01-05 PROCEDURE — 36415 COLL VENOUS BLD VENIPUNCTURE: CPT

## 2025-01-05 PROCEDURE — 6360000002 HC RX W HCPCS: Performed by: EMERGENCY MEDICINE

## 2025-01-05 PROCEDURE — 80053 COMPREHEN METABOLIC PANEL: CPT

## 2025-01-05 PROCEDURE — 6370000000 HC RX 637 (ALT 250 FOR IP): Performed by: EMERGENCY MEDICINE

## 2025-01-05 PROCEDURE — 85025 COMPLETE CBC W/AUTO DIFF WBC: CPT

## 2025-01-05 RX ORDER — PREDNISONE 50 MG/1
50 TABLET ORAL DAILY
Qty: 5 TABLET | Refills: 0 | Status: SHIPPED | OUTPATIENT
Start: 2025-01-05 | End: 2025-01-06

## 2025-01-05 RX ORDER — DEXAMETHASONE SODIUM PHOSPHATE 10 MG/ML
8 INJECTION, SOLUTION INTRAMUSCULAR; INTRAVENOUS ONCE
Status: COMPLETED | OUTPATIENT
Start: 2025-01-05 | End: 2025-01-05

## 2025-01-05 RX ORDER — OXYCODONE AND ACETAMINOPHEN 5; 325 MG/1; MG/1
1 TABLET ORAL
Status: COMPLETED | OUTPATIENT
Start: 2025-01-05 | End: 2025-01-05

## 2025-01-05 RX ORDER — CYCLOBENZAPRINE HCL 10 MG
10 TABLET ORAL 3 TIMES DAILY PRN
Qty: 21 TABLET | Refills: 0 | Status: SHIPPED | OUTPATIENT
Start: 2025-01-05 | End: 2025-01-06

## 2025-01-05 RX ADMIN — DEXAMETHASONE SODIUM PHOSPHATE 8 MG: 10 INJECTION, SOLUTION INTRAMUSCULAR; INTRAVENOUS at 23:07

## 2025-01-05 RX ADMIN — OXYCODONE HYDROCHLORIDE AND ACETAMINOPHEN 1 TABLET: 5; 325 TABLET ORAL at 23:06

## 2025-01-05 ASSESSMENT — PAIN DESCRIPTION - FREQUENCY: FREQUENCY: CONTINUOUS

## 2025-01-05 ASSESSMENT — PAIN DESCRIPTION - LOCATION: LOCATION: NECK

## 2025-01-05 ASSESSMENT — PAIN DESCRIPTION - PAIN TYPE: TYPE: ACUTE PAIN

## 2025-01-05 ASSESSMENT — PAIN SCALES - GENERAL: PAINLEVEL_OUTOF10: 8

## 2025-01-05 ASSESSMENT — PAIN DESCRIPTION - ORIENTATION: ORIENTATION: LOWER

## 2025-01-05 ASSESSMENT — PAIN DESCRIPTION - DESCRIPTORS: DESCRIPTORS: SHARP

## 2025-01-05 ASSESSMENT — PAIN - FUNCTIONAL ASSESSMENT
PAIN_FUNCTIONAL_ASSESSMENT: PREVENTS OR INTERFERES SOME ACTIVE ACTIVITIES AND ADLS
PAIN_FUNCTIONAL_ASSESSMENT: 0-10

## 2025-01-05 ASSESSMENT — PAIN DESCRIPTION - ONSET: ONSET: ON-GOING

## 2025-01-06 RX ORDER — PREDNISONE 50 MG/1
50 TABLET ORAL DAILY
Qty: 5 TABLET | Refills: 0 | Status: SHIPPED | OUTPATIENT
Start: 2025-01-06 | End: 2025-01-11

## 2025-01-06 RX ORDER — CYCLOBENZAPRINE HCL 10 MG
10 TABLET ORAL 3 TIMES DAILY PRN
Qty: 21 TABLET | Refills: 0 | Status: SHIPPED | OUTPATIENT
Start: 2025-01-06 | End: 2025-01-16

## 2025-01-06 NOTE — ED TRIAGE NOTES
Pt reports that she started with neck pain and stiffness after grocery shopping. Today the pt reports that she is experiencing bilateral arm swelling and numbness.with the neck pain.

## 2025-01-06 NOTE — ED NOTES
"18g x 1 3/4" PIV placed to LAC under U/S guidance.  " Spoke with Melina at [facility] for urgent SNF placement. Melina sts she is going to check pts insurance in the AM for placement in AM at one of their facilities. Case Management, PT and OT eval ordered. Pts information faxed.   [facility] information:  Phone# [phone]  Fax# [phone]

## 2025-01-06 NOTE — ED PROVIDER NOTES
CONSULTS:  None    PROCEDURES:  Unless otherwise noted below, none     Procedures      FINAL IMPRESSION      1. Neck pain          DISPOSITION/PLAN   DISPOSITION Decision To Discharge 01/05/2025 11:45:40 PM      PATIENT REFERRED TO:  Juana Robertson MD  [address] [place] [address] [phone]    Call       [facility] EMERGENCY DEPT  [address]  [facility] [address] [phone]    As needed, If symptoms worsen      DISCHARGE MEDICATIONS:  Discharge Medication List as of 1/6/2025 12:06 AM        START taking these medications    Details   predniSONE (DELTASONE) 50 MG tablet Take 1 tablet by mouth daily for 5 days, Disp-5 tablet, R-0Normal      cyclobenzaprine (FLEXERIL) 10 MG tablet Take 1 tablet by mouth 3 times daily as needed for Muscle spasms, Disp-21 tablet, R-0Normal               (Please note that portions of this note were completed with a voice recognition program.  Efforts were made to edit the dictations but occasionally words are mis-transcribed.)    Jac Reid MD (electronically signed)  Emergency Attending Physician / Physician Assistant / Nurse Practitioner             Jac Reid MD  01/06/25 0031

## 2025-08-18 ENCOUNTER — APPOINTMENT (OUTPATIENT)
Facility: HOSPITAL | Age: 40
End: 2025-08-18
Payer: COMMERCIAL

## 2025-08-18 ENCOUNTER — HOSPITAL ENCOUNTER (EMERGENCY)
Facility: HOSPITAL | Age: 40
Discharge: HOME | End: 2025-08-18
Attending: EMERGENCY MEDICINE
Payer: COMMERCIAL

## 2025-08-18 VITALS
SYSTOLIC BLOOD PRESSURE: 99 MMHG | HEART RATE: 71 BPM | RESPIRATION RATE: 16 BRPM | DIASTOLIC BLOOD PRESSURE: 64 MMHG | HEIGHT: 61 IN | TEMPERATURE: 98.5 F | BODY MASS INDEX: 26.43 KG/M2 | OXYGEN SATURATION: 98 % | WEIGHT: 140 LBS

## 2025-08-18 DIAGNOSIS — R51.9 NONINTRACTABLE HEADACHE, UNSPECIFIED CHRONICITY PATTERN, UNSPECIFIED HEADACHE TYPE: Primary | ICD-10-CM

## 2025-08-18 DIAGNOSIS — J34.89 NASAL PAIN: ICD-10-CM

## 2025-08-18 LAB
ALBUMIN SERPL-MCNC: 3.5 G/DL (ref 3.5–5)
ALBUMIN/GLOB SERPL: 1 (ref 1.1–2.2)
ALP SERPL-CCNC: 81 U/L (ref 45–117)
ALT SERPL-CCNC: 28 U/L (ref 12–78)
ANION GAP SERPL CALC-SCNC: 9 MMOL/L (ref 2–12)
AST SERPL-CCNC: 11 U/L (ref 15–37)
BASOPHILS # BLD: 0.08 K/UL (ref 0–0.1)
BASOPHILS NFR BLD: 0.6 % (ref 0–1)
BILIRUB SERPL-MCNC: 0.3 MG/DL (ref 0.2–1)
BUN SERPL-MCNC: 18 MG/DL (ref 6–20)
BUN/CREAT SERPL: 19 (ref 12–20)
CALCIUM SERPL-MCNC: 8.9 MG/DL (ref 8.5–10.1)
CHLORIDE SERPL-SCNC: 103 MMOL/L (ref 97–108)
CO2 SERPL-SCNC: 29 MMOL/L (ref 21–32)
CREAT SERPL-MCNC: 0.94 MG/DL (ref 0.55–1.02)
DIFFERENTIAL METHOD BLD: ABNORMAL
EOSINOPHIL # BLD: 0.21 K/UL (ref 0–0.4)
EOSINOPHIL NFR BLD: 1.6 % (ref 0–7)
ERYTHROCYTE [DISTWIDTH] IN BLOOD BY AUTOMATED COUNT: 13.1 % (ref 11.5–14.5)
GLOBULIN SER CALC-MCNC: 3.5 G/DL (ref 2–4)
GLUCOSE SERPL-MCNC: 88 MG/DL (ref 65–100)
HCT VFR BLD AUTO: 34.8 % (ref 35–47)
HGB BLD-MCNC: 11.5 G/DL (ref 11.5–16)
IMM GRANULOCYTES # BLD AUTO: 0.13 K/UL (ref 0–0.04)
IMM GRANULOCYTES NFR BLD AUTO: 1 % (ref 0–0.5)
LYMPHOCYTES # BLD: 1.71 K/UL (ref 0.8–3.5)
LYMPHOCYTES NFR BLD: 13.3 % (ref 12–49)
MCH RBC QN AUTO: 28.5 PG (ref 26–34)
MCHC RBC AUTO-ENTMCNC: 33 G/DL (ref 30–36.5)
MCV RBC AUTO: 86.4 FL (ref 80–99)
MONOCYTES # BLD: 1.14 K/UL (ref 0–1)
MONOCYTES NFR BLD: 8.8 % (ref 5–13)
NEUTS SEG # BLD: 9.63 K/UL (ref 1.8–8)
NEUTS SEG NFR BLD: 74.7 % (ref 32–75)
NRBC # BLD: 0 K/UL (ref 0–0.01)
NRBC BLD-RTO: 0 PER 100 WBC
PLATELET # BLD AUTO: 315 K/UL (ref 150–400)
PMV BLD AUTO: 9.3 FL (ref 8.9–12.9)
POTASSIUM SERPL-SCNC: 3.7 MMOL/L (ref 3.5–5.1)
PROT SERPL-MCNC: 7 G/DL (ref 6.4–8.2)
RBC # BLD AUTO: 4.03 M/UL (ref 3.8–5.2)
SODIUM SERPL-SCNC: 141 MMOL/L (ref 136–145)
WBC # BLD AUTO: 12.9 K/UL (ref 3.6–11)

## 2025-08-18 PROCEDURE — 70160 X-RAY EXAM OF NASAL BONES: CPT

## 2025-08-18 PROCEDURE — 2580000003 HC RX 258: Performed by: EMERGENCY MEDICINE

## 2025-08-18 PROCEDURE — 96375 TX/PRO/DX INJ NEW DRUG ADDON: CPT

## 2025-08-18 PROCEDURE — 6360000002 HC RX W HCPCS: Performed by: EMERGENCY MEDICINE

## 2025-08-18 PROCEDURE — 99284 EMERGENCY DEPT VISIT MOD MDM: CPT

## 2025-08-18 PROCEDURE — 80053 COMPREHEN METABOLIC PANEL: CPT

## 2025-08-18 PROCEDURE — 85025 COMPLETE CBC W/AUTO DIFF WBC: CPT

## 2025-08-18 PROCEDURE — 96374 THER/PROPH/DIAG INJ IV PUSH: CPT

## 2025-08-18 PROCEDURE — 36415 COLL VENOUS BLD VENIPUNCTURE: CPT

## 2025-08-18 PROCEDURE — 70450 CT HEAD/BRAIN W/O DYE: CPT

## 2025-08-18 RX ORDER — DIPHENHYDRAMINE HYDROCHLORIDE 50 MG/ML
25 INJECTION, SOLUTION INTRAMUSCULAR; INTRAVENOUS
Status: COMPLETED | OUTPATIENT
Start: 2025-08-18 | End: 2025-08-18

## 2025-08-18 RX ORDER — 0.9 % SODIUM CHLORIDE 0.9 %
1000 INTRAVENOUS SOLUTION INTRAVENOUS
Status: COMPLETED | OUTPATIENT
Start: 2025-08-18 | End: 2025-08-18

## 2025-08-18 RX ORDER — KETOROLAC TROMETHAMINE 30 MG/ML
15 INJECTION, SOLUTION INTRAMUSCULAR; INTRAVENOUS ONCE
Status: COMPLETED | OUTPATIENT
Start: 2025-08-18 | End: 2025-08-18

## 2025-08-18 RX ORDER — PROCHLORPERAZINE EDISYLATE 5 MG/ML
10 INJECTION INTRAMUSCULAR; INTRAVENOUS ONCE
Status: COMPLETED | OUTPATIENT
Start: 2025-08-18 | End: 2025-08-18

## 2025-08-18 RX ADMIN — PROCHLORPERAZINE EDISYLATE 10 MG: 5 INJECTION INTRAMUSCULAR; INTRAVENOUS at 17:21

## 2025-08-18 RX ADMIN — SODIUM CHLORIDE 1000 ML: 0.9 INJECTION, SOLUTION INTRAVENOUS at 17:16

## 2025-08-18 RX ADMIN — KETOROLAC TROMETHAMINE 15 MG: 30 INJECTION, SOLUTION INTRAMUSCULAR at 17:19

## 2025-08-18 RX ADMIN — DIPHENHYDRAMINE HYDROCHLORIDE 25 MG: 50 INJECTION INTRAMUSCULAR; INTRAVENOUS at 17:17

## 2025-08-18 ASSESSMENT — PAIN - FUNCTIONAL ASSESSMENT: PAIN_FUNCTIONAL_ASSESSMENT: PREVENTS OR INTERFERES SOME ACTIVE ACTIVITIES AND ADLS

## 2025-08-18 ASSESSMENT — PAIN DESCRIPTION - LOCATION
LOCATION: HEAD
LOCATION: FACE

## 2025-08-18 ASSESSMENT — LIFESTYLE VARIABLES: HOW OFTEN DO YOU HAVE A DRINK CONTAINING ALCOHOL: NEVER

## 2025-08-18 ASSESSMENT — PAIN DESCRIPTION - DESCRIPTORS
DESCRIPTORS: PRESSURE
DESCRIPTORS: PRESSURE

## 2025-08-18 ASSESSMENT — PAIN DESCRIPTION - ORIENTATION
ORIENTATION: MID
ORIENTATION: MID

## 2025-08-18 ASSESSMENT — PAIN SCALES - GENERAL
PAINLEVEL_OUTOF10: 8
PAINLEVEL_OUTOF10: 4

## (undated) DEVICE — SUTURE MCRYL SZ 2-0 L27IN ABSRB UD SH L26MM TAPERPOINT NDL Y417H

## (undated) DEVICE — DRAIN SURG W10MMXL20CM SIL FULL PERF HUBLESS FLAT RADPQ

## (undated) DEVICE — Device

## (undated) DEVICE — 4MM SINGLE USE CANNULA, 10MM PORT, 30CM LONG, FLARED MERCEDES: Brand: MICROAIRE®

## (undated) DEVICE — SOLUTION IV 1000ML 0.9% SOD CHL

## (undated) DEVICE — INTENDED FOR TISSUE SEPARATION, AND OTHER PROCEDURES THAT REQUIRE A SHARP SURGICAL BLADE TO PUNCTURE OR CUT.: Brand: BARD-PARKER ® CARBON RIB-BACK BLADES

## (undated) DEVICE — SYR 50ML LR LCK 1ML GRAD NSAF --

## (undated) DEVICE — SKIN MARKER,REGULAR TIP WITH RULER AND LABELS: Brand: DEVON

## (undated) DEVICE — DRAPE FLD WRM W44XL66IN C6L FOR INTRATEMP SYS THERMABASIN

## (undated) DEVICE — 3M™ TEGADERM™ TRANSPARENT FILM DRESSING FRAME STYLE, 1624W, 2-3/8 IN X 2-3/4 IN (6 CM X 7 CM), 100/CT 4CT/CASE: Brand: 3M™ TEGADERM™

## (undated) DEVICE — E-Z CLEAN, NON-STICK, PTFE COATED, MEGA FINE ELECTROSURGICAL NEEDLE ELECTRODE, SHARP, 2 INCH (5.1 CM): Brand: MEGADYNE

## (undated) DEVICE — PACK,ORTOHMAX/CVMAX,UNIVERSAL,5/CS: Brand: MEDLINE

## (undated) DEVICE — ELECTRODE NDL L2CM TUNGSTEN MICSURG STR IMPROVED CUT AND

## (undated) DEVICE — TUBING SUCT L9FT FOR AUTOFUSE INFLTR SYS

## (undated) DEVICE — DEVON™ KNEE AND BODY STRAP 60" X 3" (1.5 M X 7.6 CM): Brand: DEVON

## (undated) DEVICE — DRAPE,REIN 53X77,STERILE: Brand: MEDLINE

## (undated) DEVICE — SYR 10ML LUER LOK 1/5ML GRAD --

## (undated) DEVICE — SUTURE PDS II SZ 3-0 L27IN ABSRB CLR SH L26MM 1/2 CIR TAPR Z416H

## (undated) DEVICE — SUTURE PDS II SZ 2-0 L36IN ABSRB VLT CT L40MM 1/2 CIR TAPR Z357H

## (undated) DEVICE — 3M™ BAIR HUGGER® UNDERBODY BLANKET, FULL ACCESS, 10 PER CASE 63500: Brand: BAIR HUGGER™

## (undated) DEVICE — 3MM SINGLE USE CANNULA, 8MM PORT, 30CM LONG, FLARED MERCEDES: Brand: MICROAIRE®

## (undated) DEVICE — SURGICAL PROCEDURE PACK BASIN MAJ SET CUST NO CAUT

## (undated) DEVICE — SUT PROL 0 30IN CT1 BLU --

## (undated) DEVICE — BANDAGE COMPR SELF ADH 5 YDX4 IN TAN STRL PREMIERPRO LF

## (undated) DEVICE — INFECTION CONTROL KIT SYS

## (undated) DEVICE — BINDER ABD S/M 12X30-45IN --

## (undated) DEVICE — JP CHAN DRN SIL HUBLESS 15FR W/TRO: Brand: CARDINAL HEALTH

## (undated) DEVICE — GAUZE SPONGES,12 PLY: Brand: CURITY

## (undated) DEVICE — SUTURE MCRYL SZ 4-0 L27IN ABSRB UD L19MM PS-2 1/2 CIR PRIM Y426H

## (undated) DEVICE — REM POLYHESIVE ADULT PATIENT RETURN ELECTRODE: Brand: VALLEYLAB

## (undated) DEVICE — SPONGE LAP 18X18IN STRL -- 5/PK

## (undated) DEVICE — ABDOMINAL PAD: Brand: DERMACEA

## (undated) DEVICE — DERMABOND SKIN ADH 0.7ML -- DERMABOND ADVANCED 12/BX

## (undated) DEVICE — SUTURE PDS II SZ 2-0 L27IN ABSRB VLT L36MM CT-1 1/2 CIR Z339H

## (undated) DEVICE — NDL SPNE QNCKE 18GX3.5IN LF --

## (undated) DEVICE — TRAY CATH OD16FR SIL URIN M STATLOK STBL DEV SURSTP

## (undated) DEVICE — SYSTEM IMPL DEL FOR BRST IMPL FUN (SEE COMMENT)

## (undated) DEVICE — MEDI-VAC NON-CONDUCTIVE SUCTION TUBING: Brand: CARDINAL HEALTH

## (undated) DEVICE — TOWEL SURG W17XL27IN STD BLU COT NONFENESTRATED PREWASHED

## (undated) DEVICE — INSULATED BLADE ELECTRODE;CAUTION: FOR MANUFACTURING, PROCESSING, OR REPACKING.: Brand: EDGE

## (undated) DEVICE — SUTURE STRATAFIX SPRL SZ 4-0 L12IN ABSRB UD FS-2 L19MM 3/8 SXMP2B409

## (undated) DEVICE — 3M™ TEGADERM™ TRANSPARENT FILM DRESSING FRAME STYLE, 1626W, 4 IN X 4-3/4 IN (10 CM X 12 CM), 50/CT 4CT/CASE: Brand: 3M™ TEGADERM™

## (undated) DEVICE — ASEPTIC TRANSFER SYSTEM --

## (undated) DEVICE — SUTURE VCRL SZ 2-0 L27IN ABSRB UD L26MM SH 1/2 CIR J417H

## (undated) DEVICE — NEEDLE HYPO 22GA L1.5IN BLK S STL HUB POLYPR SHLD REG BVL

## (undated) DEVICE — DRAIN SURG 19FR SIL RND HUBLESS W/ 0.25IN BEND TRCR BLAK

## (undated) DEVICE — SUT SLK 2-0SH 30IN BLK --

## (undated) DEVICE — SUTURE ETHLN SZ 2-0 L18IN NONABSORBABLE BLK L19MM PS-2 PRIM 593H

## (undated) DEVICE — Z DISCONTINUED PER MEDLINE PACK PROCEDURE SURG PLAS

## (undated) DEVICE — STAPLER SKIN SQ 30 ABSRB STPL DISP INSORB

## (undated) DEVICE — SUTURE GOR TX SZ 0 L36IN NONABSORBABLE L26MM TH-26 1/2 CIR 2N01A

## (undated) DEVICE — INSULATED BLADE ELECTRODE: Brand: EDGE

## (undated) DEVICE — SUTURE PDS II SZ 2-0 L27IN ABSRB UD CT-1 L36MM 1/2 CIR Z259H

## (undated) DEVICE — BASIC PACK: Brand: CONVERTORS

## (undated) DEVICE — SKIN CLOS DERMABND PRINEO 60CM -- DERMABOUND PRINEO

## (undated) DEVICE — MICRODISSECTION NEEDLE STRAIGHT SLEEVE: Brand: COLORADO

## (undated) DEVICE — LIGHT HANDLE: Brand: DEVON

## (undated) DEVICE — (D)PREP SKN CHLRAPRP APPL 26ML -- CONVERT TO ITEM 371833

## (undated) DEVICE — STERILE POLYISOPRENE POWDER-FREE SURGICAL GLOVES: Brand: PROTEXIS

## (undated) DEVICE — 1200 GUARD II KIT W/5MM TUBE W/O VAC TUBE: Brand: GUARDIAN

## (undated) DEVICE — SUTURE MCRYL SZ 3-0 L27IN ABSRB UD L24MM PS-1 3/8 CIR PRIM Y936H

## (undated) DEVICE — ROCKER SWITCH PENCIL BLADE ELECTRODE, HOLSTER: Brand: EDGE

## (undated) DEVICE — 2DSM24 2-0 UND MONODERM 30X30: Brand: 2DSM24 2-0 UND MONODERM 30X30

## (undated) DEVICE — STAPLER SKIN H3.9MM WIRE DIA0.58MM CRWN 6.9MM 35 STPL ROT

## (undated) DEVICE — SUT PROL 1 30IN CT1 BLU --

## (undated) DEVICE — APPLIER LIG CLP M L11IN TI STR RNG HNDL FOR 20 CLP DISP

## (undated) DEVICE — 3000CC GUARDIAN II: Brand: GUARDIAN

## (undated) DEVICE — SUTURE MCRYL SZ 3-0 L27IN ABSRB UD L19MM PS-2 3/8 CIR PRIM Y427H

## (undated) DEVICE — ASPIRATION TUBING SET, DISPOSABLE: Brand: MICROAIRE®

## (undated) DEVICE — SUTURE PDS II SZ 0 L36IN ABSRB VLT L40MM CT 1/2 CIR Z358T

## (undated) DEVICE — SUTURE STRATAFIX SYMMETRIC PDS + SZ 0 L18IN ABSRB VLT CT SXPP1A406

## (undated) DEVICE — STAPLER SKIN 35CT WD STRL DISP -- MULTIFIRE PREMIUM

## (undated) DEVICE — 3MM SINGLE USE CANNULA, 8MM PORT, 30CM LONG, MERCEDES: Brand: MICROAIRE®

## (undated) DEVICE — SUT ETHLN 3-0 18IN PS1 BLK --

## (undated) DEVICE — KENDALL SCD EXPRESS SLEEVES, KNEE LENGTH, MEDIUM: Brand: KENDALL SCD

## (undated) DEVICE — SUTURE PROL SZ 5-0 L18IN NONABSORBABLE BLU L16MM PC-3 3/8 8635G

## (undated) DEVICE — APPLIER LIG CLP 115IN AUTO W SUP INTLOK 30 M TI CLP PREM

## (undated) DEVICE — DRSG PATCH ANTIMIC 1INX4.0MM -- CONVERT TO ITEM 356053

## (undated) DEVICE — 4MM SINGLE USE CANNULA, 10MM PORT, 30CM LONG, DOUBLE MERCEDES: Brand: MICROAIRE®

## (undated) DEVICE — COVER LT HNDL BLU PLAS

## (undated) DEVICE — SMOKE EVACUATION PENCIL: Brand: VALLEYLAB